# Patient Record
Sex: MALE | URBAN - NONMETROPOLITAN AREA
[De-identification: names, ages, dates, MRNs, and addresses within clinical notes are randomized per-mention and may not be internally consistent; named-entity substitution may affect disease eponyms.]

---

## 2017-01-13 ENCOUNTER — PATIENT OUTREACH (OUTPATIENT)
Dept: FAMILY MEDICINE CLINIC | Facility: CLINIC | Age: 57
End: 2017-01-13

## 2017-01-19 ENCOUNTER — PATIENT OUTREACH (OUTPATIENT)
Dept: FAMILY MEDICINE CLINIC | Facility: CLINIC | Age: 57
End: 2017-01-19

## 2017-06-12 ENCOUNTER — CHARTING TRANS (OUTPATIENT)
Dept: OTHER | Age: 57
End: 2017-06-12

## 2017-06-12 ENCOUNTER — CHARTING TRANS (OUTPATIENT)
Dept: INTERNAL MEDICINE | Age: 57
End: 2017-06-12

## 2017-06-14 ENCOUNTER — CHARTING TRANS (OUTPATIENT)
Dept: OTHER | Age: 57
End: 2017-06-14

## 2017-06-20 ENCOUNTER — CHARTING TRANS (OUTPATIENT)
Dept: OTHER | Age: 57
End: 2017-06-20

## 2017-07-07 ENCOUNTER — CHARTING TRANS (OUTPATIENT)
Dept: OTHER | Age: 57
End: 2017-07-07

## 2017-07-12 ENCOUNTER — CHARTING TRANS (OUTPATIENT)
Dept: OTHER | Age: 57
End: 2017-07-12

## 2017-07-13 ENCOUNTER — CHARTING TRANS (OUTPATIENT)
Dept: OTHER | Age: 57
End: 2017-07-13

## 2017-07-24 ENCOUNTER — CHARTING TRANS (OUTPATIENT)
Dept: OTHER | Age: 57
End: 2017-07-24

## 2017-09-14 ENCOUNTER — CHARTING TRANS (OUTPATIENT)
Dept: OTHER | Age: 57
End: 2017-09-14

## 2017-12-14 ENCOUNTER — LAB SERVICES (OUTPATIENT)
Dept: OTHER | Age: 57
End: 2017-12-14

## 2017-12-14 ENCOUNTER — CHARTING TRANS (OUTPATIENT)
Dept: INTERNAL MEDICINE | Age: 57
End: 2017-12-14

## 2017-12-14 LAB
ALBUMIN SERPL BCG-MCNC: 4.1 G/DL (ref 3.6–5.1)
ALP SERPL-CCNC: 83 U/L (ref 30–130)
ALT SERPL W/O P-5'-P-CCNC: 31 U/L (ref 10–35)
AST SERPL-CCNC: 21 U/L (ref 9–37)
BILIRUB SERPL-MCNC: 0.3 MG/DL (ref 0–1)
BUN SERPL-MCNC: 19 MG/DL (ref 6–27)
CALCIUM SERPL-MCNC: 8.9 MG/DL (ref 8.6–10.6)
CHLORIDE SERPL-SCNC: 101 MMOL/L (ref 96–107)
CREATININE, SERUM: 0.9 MG/DL (ref 0.6–1.6)
GFR SERPL CREATININE-BSD FRML MDRD: >60 ML/MIN/{1.73M2}
GFR SERPL CREATININE-BSD FRML MDRD: >60 ML/MIN/{1.73M2}
GLUCOSE SERPL-MCNC: 91 MG/DL (ref 70–200)
HCO3 SERPL-SCNC: 29 MMOL/L (ref 22–32)
POTASSIUM SERPL-SCNC: 4.3 MMOL/L (ref 3.5–5.3)
PROT SERPL-MCNC: 6.7 G/DL (ref 6.2–8.1)
SODIUM SERPL-SCNC: 137 MMOL/L (ref 136–146)
TESTOST SERPL-MCNC: 298 NG/DL (ref 200–813)
TSH SERPL-ACNC: <0.02 M[IU]/L (ref 0.3–4.82)

## 2017-12-15 ENCOUNTER — CHARTING TRANS (OUTPATIENT)
Dept: OTHER | Age: 57
End: 2017-12-15

## 2017-12-26 ENCOUNTER — CHARTING TRANS (OUTPATIENT)
Dept: OTHER | Age: 57
End: 2017-12-26

## 2018-01-02 ENCOUNTER — CHARTING TRANS (OUTPATIENT)
Dept: OTHER | Age: 58
End: 2018-01-02

## 2018-01-17 ENCOUNTER — CHARTING TRANS (OUTPATIENT)
Dept: OTHER | Age: 58
End: 2018-01-17

## 2018-01-25 ENCOUNTER — CHARTING TRANS (OUTPATIENT)
Dept: OTHER | Age: 58
End: 2018-01-25

## 2018-02-02 ENCOUNTER — CHARTING TRANS (OUTPATIENT)
Dept: OTHER | Age: 58
End: 2018-02-02

## 2018-02-05 ENCOUNTER — CHARTING TRANS (OUTPATIENT)
Dept: OTHER | Age: 58
End: 2018-02-05

## 2018-02-20 ENCOUNTER — CHARTING TRANS (OUTPATIENT)
Dept: OTHER | Age: 58
End: 2018-02-20

## 2018-02-21 ENCOUNTER — CHARTING TRANS (OUTPATIENT)
Dept: OTHER | Age: 58
End: 2018-02-21

## 2018-03-01 ENCOUNTER — LAB SERVICES (OUTPATIENT)
Dept: OTHER | Age: 58
End: 2018-03-01

## 2018-03-01 ENCOUNTER — CHARTING TRANS (OUTPATIENT)
Dept: OTHER | Age: 58
End: 2018-03-01

## 2018-03-01 LAB — TSH SERPL-ACNC: <0.02 M[IU]/L (ref 0.3–4.82)

## 2018-03-12 ENCOUNTER — CHARTING TRANS (OUTPATIENT)
Dept: OTHER | Age: 58
End: 2018-03-12

## 2018-03-29 ENCOUNTER — CHARTING TRANS (OUTPATIENT)
Dept: OTHER | Age: 58
End: 2018-03-29

## 2018-05-01 ENCOUNTER — CHARTING TRANS (OUTPATIENT)
Dept: OTHER | Age: 58
End: 2018-05-01

## 2018-05-14 ENCOUNTER — CHARTING TRANS (OUTPATIENT)
Dept: OTHER | Age: 58
End: 2018-05-14

## 2018-05-22 ENCOUNTER — CHARTING TRANS (OUTPATIENT)
Dept: OTHER | Age: 58
End: 2018-05-22

## 2018-06-11 ENCOUNTER — CHARTING TRANS (OUTPATIENT)
Dept: OTHER | Age: 58
End: 2018-06-11

## 2018-06-11 ENCOUNTER — LAB SERVICES (OUTPATIENT)
Dept: OTHER | Age: 58
End: 2018-06-11

## 2018-06-11 LAB
ALBUMIN SERPL BCG-MCNC: 4.1 G/DL (ref 3.6–5.1)
ALP SERPL-CCNC: 86 U/L (ref 45–115)
ALT SERPL W/O P-5'-P-CCNC: 36 U/L (ref 10–35)
AST SERPL-CCNC: 21 U/L (ref 9–37)
BILIRUB SERPL-MCNC: <0.2 MG/DL (ref 0–1)
BUN SERPL-MCNC: 16 MG/DL (ref 6–27)
CALCIUM SERPL-MCNC: 8.7 MG/DL (ref 8.6–10.6)
CHLORIDE SERPL-SCNC: 105 MMOL/L (ref 96–107)
CREAT SERPL-MCNC: 0.8 MG/DL (ref 0.6–1.6)
DIFFERENTIAL TYPE: ABNORMAL
GFR SERPL CREATININE-BSD FRML MDRD: >60 ML/MIN/{1.73M2}
GFR SERPL CREATININE-BSD FRML MDRD: >60 ML/MIN/{1.73M2}
GLUCOSE SERPL-MCNC: 104 MG/DL (ref 70–200)
HCO3 SERPL-SCNC: 29 MMOL/L (ref 22–32)
HEMATOCRIT: 40.7 % (ref 40–51)
HEMOGLOBIN: 14.4 G/DL (ref 13.7–17.5)
LYMPH PERCENT: 28.4 % (ref 20.5–51.1)
LYMPHOCYTE ABSOLUTE #: 1.8 10*3/UL (ref 1.2–3.4)
MEAN CORPUSCULAR HGB CONCENTRATION: 35.4 % (ref 32–36)
MEAN CORPUSCULAR HGB: 32.1 PG (ref 27–34)
MEAN CORPUSCULAR VOLUME: 90.6 FL (ref 79–95)
MEAN PLATELET VOLUME: 8.4 FL (ref 8.6–12.4)
MIXED %: 9.6 % (ref 4.3–12.9)
MIXED ABSOLUTE #: 0.6 10*3/UL (ref 0.2–0.9)
NEUTROPHIL ABSOLUTE #: 4.1 10*3/UL (ref 1.4–6.5)
NEUTROPHIL PERCENT: 62 % (ref 34–73.5)
PLATELET COUNT: 258 10*3/UL (ref 150–400)
POTASSIUM SERPL-SCNC: 4.7 MMOL/L (ref 3.5–5.3)
PROT SERPL-MCNC: 6.8 G/DL (ref 6.2–8.1)
RED BLOOD CELL COUNT: 4.49 10*6/UL (ref 3.9–5.7)
RED CELL DISTRIBUTION WIDTH: 12.9 % (ref 11.3–14.8)
SODIUM SERPL-SCNC: 139 MMOL/L (ref 136–146)
TESTOST SERPL-MCNC: 62 NG/DL (ref 200–813)
TSH SERPL DL<=0.05 MIU/L-ACNC: 0.02 M[IU]/L (ref 0.3–4.82)
WHITE BLOOD CELL COUNT: 6.5 10*3/UL (ref 4–10)

## 2018-07-02 ENCOUNTER — CHARTING TRANS (OUTPATIENT)
Dept: OTHER | Age: 58
End: 2018-07-02

## 2018-07-03 ENCOUNTER — CHARTING TRANS (OUTPATIENT)
Dept: OTHER | Age: 58
End: 2018-07-03

## 2018-07-10 ENCOUNTER — CHARTING TRANS (OUTPATIENT)
Dept: OTHER | Age: 58
End: 2018-07-10

## 2018-07-16 ENCOUNTER — CHARTING TRANS (OUTPATIENT)
Dept: OTHER | Age: 58
End: 2018-07-16

## 2018-07-17 ENCOUNTER — LAB SERVICES (OUTPATIENT)
Dept: OTHER | Age: 58
End: 2018-07-17

## 2018-07-17 LAB
ANION GAP: 9 MEQ/L (ref 8–16)
BASOPHIL AUTOMATED: 0.8 %
BASOPHILS: 0.1 (ref 0–0.2)
BLOOD UREA NITROGEN (BUN): 9 MG/DL (ref 7–25)
BUN/CREATININE RATIO: 11.7 (ref 7.4–23)
CALCIUM, SERUM: 8.1 MG/DL (ref 8.6–10.3)
CARBON DIOXIDE: 23 MMOL/L (ref 21–31)
CHLORIDE, SERUM: 95 MMOL/L (ref 98–107)
CREATININE: 0.77 MG/DL (ref 0.7–1.3)
EOSINOPHIL AUTOMATED: 1.1 %
EOSINOPHILS: 0.1 (ref 0–0.5)
EST GLOMERULAR FILTRATION RATE: > 60 1.73M SQ
GLUCOSE P FAST SERPL-MCNC: 93 MG/DL (ref 70–99)
HEMATOCRIT: 41.6 % (ref 38.6–49.2)
HEMOGLOBIN: 14.2 GM/DL (ref 13–17)
K (POTASSIUM, SERUM): 4.1 MMOL/L (ref 3.5–5.1)
LYMPHOCYTE AUTOMATED: 28.5 %
LYMPHOCYTES: 2.2 (ref 0.6–3.4)
MEAN CORPUSCULAR HGB: 31 PG (ref 26–34)
MEAN CORPUSCULAR HGB: 34.2 G/DL (ref 32.5–35.8)
MEAN CORPUSCULAR VOL: 90.6 FL (ref 80–100)
MEAN PLATELET VOLUME: 7.5 FL (ref 6.8–10.2)
MONOCYTE AUTOMATED: 8.6 %
MONOCYTES: 0.7 (ref 0.3–1)
NA (SODIUM, SERUM): 127 MMOL/L (ref 133–144)
NEUTROPHIL ABSOLUTE: 4.6 (ref 1.7–7.7)
NEUTROPHIL AUTOMATED: 61 %
PLATELET COUNT: 307 K/MM3 (ref 150–450)
RED BLOOD CELL COUNT: 4.59 M/MM3 (ref 4.34–5.6)
RED CELL DISTRIBUTIO: 12.6 % (ref 11.9–15.9)
TROPONIN I (TROP): < 0.03 NG/ML
TROPONIN I (TROP): < 0.03 NG/ML
WHITE BLOOD CELL COU: 7.6 K/MM3 (ref 4–11)

## 2018-07-18 ENCOUNTER — CHARTING TRANS (OUTPATIENT)
Dept: OTHER | Age: 58
End: 2018-07-18

## 2018-07-19 ENCOUNTER — TELEPHONE (OUTPATIENT)
Dept: FAMILY MEDICINE CLINIC | Facility: CLINIC | Age: 58
End: 2018-07-19

## 2018-07-19 NOTE — TELEPHONE ENCOUNTER
Called patients brother back instructed that we are no longer patients primary, he understood, and states he had called patients new PCP and they did not have any record of this, and instructed to call us.  Informed I cannot release information, he understo

## 2018-07-26 ENCOUNTER — CHARTING TRANS (OUTPATIENT)
Dept: OTHER | Age: 58
End: 2018-07-26

## 2018-07-27 ENCOUNTER — CHARTING TRANS (OUTPATIENT)
Dept: OTHER | Age: 58
End: 2018-07-27

## 2018-07-30 ENCOUNTER — CHARTING TRANS (OUTPATIENT)
Dept: OTHER | Age: 58
End: 2018-07-30

## 2018-08-01 ENCOUNTER — CHARTING TRANS (OUTPATIENT)
Dept: OTHER | Age: 58
End: 2018-08-01

## 2018-08-03 ENCOUNTER — CHARTING TRANS (OUTPATIENT)
Dept: OTHER | Age: 58
End: 2018-08-03

## 2018-08-15 ENCOUNTER — CHARTING TRANS (OUTPATIENT)
Dept: OTHER | Age: 58
End: 2018-08-15

## 2018-08-21 ENCOUNTER — CHARTING TRANS (OUTPATIENT)
Dept: OTHER | Age: 58
End: 2018-08-21

## 2018-08-29 ENCOUNTER — CHARTING TRANS (OUTPATIENT)
Dept: OTHER | Age: 58
End: 2018-08-29

## 2018-09-04 ENCOUNTER — CHARTING TRANS (OUTPATIENT)
Dept: OTHER | Age: 58
End: 2018-09-04

## 2018-09-10 ENCOUNTER — CHARTING TRANS (OUTPATIENT)
Dept: OTHER | Age: 58
End: 2018-09-10

## 2018-09-13 ENCOUNTER — CHARTING TRANS (OUTPATIENT)
Dept: OTHER | Age: 58
End: 2018-09-13

## 2018-09-14 ENCOUNTER — CHARTING TRANS (OUTPATIENT)
Dept: OTHER | Age: 58
End: 2018-09-14

## 2018-09-28 ENCOUNTER — CHARTING TRANS (OUTPATIENT)
Dept: OTHER | Age: 58
End: 2018-09-28

## 2018-11-01 ENCOUNTER — CHARTING TRANS (OUTPATIENT)
Dept: OTHER | Age: 58
End: 2018-11-01

## 2018-11-23 ENCOUNTER — IMAGING SERVICES (OUTPATIENT)
Dept: OTHER | Age: 58
End: 2018-11-23

## 2018-11-27 VITALS
BODY MASS INDEX: 32.73 KG/M2 | SYSTOLIC BLOOD PRESSURE: 118 MMHG | HEART RATE: 82 BPM | OXYGEN SATURATION: 98 % | HEIGHT: 69 IN | DIASTOLIC BLOOD PRESSURE: 72 MMHG | TEMPERATURE: 96.8 F | WEIGHT: 221 LBS

## 2018-11-28 VITALS
WEIGHT: 231 LBS | SYSTOLIC BLOOD PRESSURE: 122 MMHG | OXYGEN SATURATION: 98 % | BODY MASS INDEX: 34.21 KG/M2 | HEIGHT: 69 IN | HEART RATE: 77 BPM | DIASTOLIC BLOOD PRESSURE: 74 MMHG | TEMPERATURE: 97.2 F

## 2018-12-15 RX ORDER — HYDROCORTISONE 10 MG/1
TABLET ORAL
Qty: 42 TABLET | Refills: 5 | Status: SHIPPED | OUTPATIENT
Start: 2018-12-15 | End: 2019-03-04 | Stop reason: SDUPTHER

## 2018-12-15 RX ORDER — DESMOPRESSIN ACETATE 0.2 MG/1
TABLET ORAL
Qty: 84 TABLET | Refills: 5 | Status: SHIPPED | OUTPATIENT
Start: 2018-12-15 | End: 2019-03-04 | Stop reason: SDUPTHER

## 2018-12-27 RX ORDER — ESLICARBAZEPINE ACETATE 400 MG/1
TABLET ORAL
Qty: 14 TABLET | Refills: 1 | Status: SHIPPED | OUTPATIENT
Start: 2018-12-27 | End: 2019-01-23 | Stop reason: SDUPTHER

## 2019-01-01 ENCOUNTER — EXTERNAL RECORD (OUTPATIENT)
Dept: HEALTH INFORMATION MANAGEMENT | Facility: OTHER | Age: 59
End: 2019-01-01

## 2019-01-03 ENCOUNTER — APPOINTMENT (OUTPATIENT)
Dept: LAB | Age: 59
End: 2019-01-03

## 2019-01-03 ENCOUNTER — OFFICE VISIT (OUTPATIENT)
Dept: INTERNAL MEDICINE | Age: 59
End: 2019-01-03

## 2019-01-03 VITALS
BODY MASS INDEX: 35.81 KG/M2 | HEART RATE: 70 BPM | TEMPERATURE: 96.6 F | DIASTOLIC BLOOD PRESSURE: 80 MMHG | SYSTOLIC BLOOD PRESSURE: 120 MMHG | RESPIRATION RATE: 18 BRPM | WEIGHT: 236.3 LBS | HEIGHT: 68 IN

## 2019-01-03 DIAGNOSIS — E03.9 ACQUIRED HYPOTHYROIDISM: ICD-10-CM

## 2019-01-03 DIAGNOSIS — E23.2 DIABETES INSIPIDUS (CMD): ICD-10-CM

## 2019-01-03 DIAGNOSIS — E23.0 PANHYPOPITUITARISM (DIABETES INSIPIDUS/ANTERIOR PITUITARY DEFICIENCY) (CMD): Primary | ICD-10-CM

## 2019-01-03 DIAGNOSIS — S00.83XA CONTUSION OF FACE, INITIAL ENCOUNTER: ICD-10-CM

## 2019-01-03 DIAGNOSIS — E66.09 CLASS 2 OBESITY DUE TO EXCESS CALORIES WITHOUT SERIOUS COMORBIDITY WITH BODY MASS INDEX (BMI) OF 36.0 TO 36.9 IN ADULT: ICD-10-CM

## 2019-01-03 PROBLEM — R79.89 LOW TESTOSTERONE LEVEL IN MALE: Status: ACTIVE | Noted: 2017-06-12

## 2019-01-03 PROBLEM — R56.9 SEIZURE (CMD): Status: ACTIVE | Noted: 2018-10-11

## 2019-01-03 PROCEDURE — 99214 OFFICE O/P EST MOD 30 MIN: CPT | Performed by: INTERNAL MEDICINE

## 2019-01-03 RX ORDER — LAMOTRIGINE 200 MG/1
200 TABLET ORAL DAILY
COMMUNITY
Start: 2018-07-09 | End: 2023-01-01 | Stop reason: SDUPTHER

## 2019-01-03 RX ORDER — CLONAZEPAM 0.5 MG/1
0.5 TABLET ORAL DAILY
COMMUNITY
End: 2021-04-12 | Stop reason: SDUPTHER

## 2019-01-03 RX ORDER — LEVOTHYROXINE SODIUM 112 UG/1
112 TABLET ORAL DAILY
COMMUNITY
Start: 2018-10-16 | End: 2019-05-14

## 2019-01-03 SDOH — HEALTH STABILITY: MENTAL HEALTH: HOW OFTEN DO YOU HAVE A DRINK CONTAINING ALCOHOL?: NEVER

## 2019-01-03 ASSESSMENT — ENCOUNTER SYMPTOMS
RHINORRHEA: 1
FEVER: 0
WEAKNESS: 0
CONSTIPATION: 0
CHEST TIGHTNESS: 0
COUGH: 0
FACIAL SWELLING: 1
NERVOUS/ANXIOUS: 1
BLOOD IN STOOL: 0
ABDOMINAL PAIN: 0
ADENOPATHY: 0
DIARRHEA: 0
SHORTNESS OF BREATH: 0
CHILLS: 0
NAUSEA: 0
WHEEZING: 0
HEADACHES: 0
DIZZINESS: 0
SINUS PRESSURE: 1

## 2019-01-11 ENCOUNTER — TELEPHONE (OUTPATIENT)
Dept: ANTICOAGULATION | Age: 59
End: 2019-01-11

## 2019-01-15 RX ORDER — TESTOSTERONE 2 MG/D
PATCH TRANSDERMAL
Qty: 30 PATCH | Refills: 0 | Status: SHIPPED | OUTPATIENT
Start: 2019-01-15 | End: 2019-03-25 | Stop reason: SDUPTHER

## 2019-01-24 ENCOUNTER — IMAGING SERVICES (OUTPATIENT)
Dept: OTHER | Age: 59
End: 2019-01-24

## 2019-01-24 RX ORDER — ESLICARBAZEPINE ACETATE 400 MG/1
TABLET ORAL
Qty: 14 TABLET | Refills: 5 | Status: SHIPPED | OUTPATIENT
Start: 2019-01-24 | End: 2019-04-15 | Stop reason: SDUPTHER

## 2019-03-05 VITALS
DIASTOLIC BLOOD PRESSURE: 86 MMHG | HEART RATE: 69 BPM | OXYGEN SATURATION: 96 % | SYSTOLIC BLOOD PRESSURE: 129 MMHG | TEMPERATURE: 96 F | RESPIRATION RATE: 18 BRPM

## 2019-03-05 VITALS
TEMPERATURE: 96 F | HEART RATE: 70 BPM | DIASTOLIC BLOOD PRESSURE: 80 MMHG | SYSTOLIC BLOOD PRESSURE: 135 MMHG | OXYGEN SATURATION: 97 % | SYSTOLIC BLOOD PRESSURE: 119 MMHG | DIASTOLIC BLOOD PRESSURE: 72 MMHG | WEIGHT: 237 LBS | BODY MASS INDEX: 35 KG/M2 | RESPIRATION RATE: 18 BRPM | HEART RATE: 69 BPM

## 2019-03-05 VITALS
TEMPERATURE: 97.4 F | DIASTOLIC BLOOD PRESSURE: 80 MMHG | HEART RATE: 70 BPM | HEIGHT: 69 IN | SYSTOLIC BLOOD PRESSURE: 128 MMHG | BODY MASS INDEX: 34.8 KG/M2 | WEIGHT: 235 LBS | OXYGEN SATURATION: 95 %

## 2019-03-05 VITALS
BODY MASS INDEX: 35.4 KG/M2 | WEIGHT: 239 LBS | TEMPERATURE: 97.4 F | HEIGHT: 69 IN | DIASTOLIC BLOOD PRESSURE: 80 MMHG | SYSTOLIC BLOOD PRESSURE: 132 MMHG | HEART RATE: 62 BPM

## 2019-03-06 VITALS
WEIGHT: 243 LBS | DIASTOLIC BLOOD PRESSURE: 70 MMHG | HEIGHT: 69 IN | SYSTOLIC BLOOD PRESSURE: 130 MMHG | HEART RATE: 62 BPM | TEMPERATURE: 96.8 F | BODY MASS INDEX: 35.99 KG/M2 | OXYGEN SATURATION: 97 %

## 2019-03-06 VITALS
TEMPERATURE: 96.2 F | HEART RATE: 71 BPM | RESPIRATION RATE: 18 BRPM | OXYGEN SATURATION: 98 % | WEIGHT: 237 LBS | SYSTOLIC BLOOD PRESSURE: 106 MMHG | DIASTOLIC BLOOD PRESSURE: 82 MMHG | BODY MASS INDEX: 35 KG/M2

## 2019-03-07 RX ORDER — DESMOPRESSIN ACETATE 0.2 MG/1
TABLET ORAL
Qty: 180 TABLET | Refills: 4 | Status: SHIPPED | OUTPATIENT
Start: 2019-03-07 | End: 2019-07-22 | Stop reason: SDUPTHER

## 2019-03-07 RX ORDER — LEVOTHYROXINE SODIUM 112 UG/1
TABLET ORAL
Qty: 14 TABLET | Refills: 5 | OUTPATIENT
Start: 2019-03-07

## 2019-03-07 RX ORDER — HYDROCORTISONE 10 MG/1
TABLET ORAL
Qty: 60 TABLET | Refills: 4 | Status: SHIPPED | OUTPATIENT
Start: 2019-03-07 | End: 2019-06-10 | Stop reason: SDUPTHER

## 2019-03-25 RX ORDER — TESTOSTERONE 2 MG/D
PATCH TRANSDERMAL
Qty: 30 PATCH | Refills: 4 | Status: SHIPPED | OUTPATIENT
Start: 2019-03-25 | End: 2019-08-02 | Stop reason: SDUPTHER

## 2019-04-04 LAB
*MEAN CORPUSCULAR HGB CONC: 36 G/DL (ref 32.5–35.8)
A/G RATIO: 1.35 (ref 1.1–2.4)
ALANINE AMINOTRANSFE: 18 U/L (ref 7–52)
ALBUMIN, SERUM (ALB): 4.2 G/DL (ref 3.5–5.7)
ALKALINE PHOSPHATASE (ALK): 73 U/L (ref 34–104)
ANION GAP: 8 MEQ/L (ref 8–16)
ASPARTATE AMINOTRANS: 16 U/L (ref 13–39)
BASOPHIL AUTOMATED: 1.2 %
BASOPHILS: 0.1 (ref 0–0.2)
BILIRUBIN, TOTAL: 0.4 MG/DL (ref 0.2–0.8)
BLOOD UREA NITROGEN (BUN): 13 MG/DL (ref 7–25)
BUN/CREATININE RATIO: 14.8 (ref 7.4–23)
CALCIUM, SERUM: 9 MG/DL (ref 8.6–10.3)
CARBON DIOXIDE: 24 MMOL/L (ref 21–31)
CHLORIDE, SERUM: 101 MMOL/L (ref 98–107)
CREATININE: 0.88 MG/DL (ref 0.7–1.3)
EOSINOPHIL AUTOMATED: 1.3 %
EOSINOPHILS: 0.1 (ref 0–0.5)
EST GLOMERULAR FILTRATION RATE: > 60 1.73M SQ
GLUCOSE: 103 MG/DL (ref 70–99)
HEMATOCRIT: 39.2 % (ref 38.6–49.2)
HEMOGLOBIN: 14.1 GM/DL (ref 13–17)
INFLUENZA ANTIGEN: NORMAL
K (POTASSIUM, SERUM): 3.9 MMOL/L (ref 3.5–5.1)
LIPASE: 7 U/L (ref 11–82)
LYMPHOCYTE AUTOMATED: 20.9 %
LYMPHOCYTES: 1.7 (ref 0.6–3.4)
MEAN CORPUSCULAR HGB: 31.5 PG (ref 26–34)
MEAN CORPUSCULAR VOL: 87.5 FL (ref 80–100)
MEAN PLATELET VOLUME: 7.6 FL (ref 6.8–10.2)
MONOCYTE AUTOMATED: 9.1 %
MONOCYTES: 0.7 (ref 0.3–1)
NA (SODIUM, SERUM): 133 MMOL/L (ref 133–144)
NEUTROPHIL ABSOLUTE: 5.4 (ref 1.7–7.7)
NEUTROPHIL AUTOMATED: 67.5 %
PLATELET COUNT: 378 K/MM3 (ref 150–450)
PROTEIN TOTAL: 7.3 G/DL (ref 6.4–8.9)
RED BLOOD CELL COUNT: 4.48 M/MM3 (ref 4.34–5.6)
RED CELL DISTRIBUTIO: 13.2 % (ref 11.9–15.9)
WHITE BLOOD CELL COU: 8 K/MM3 (ref 4–11)

## 2019-04-11 ENCOUNTER — TELEPHONE (OUTPATIENT)
Dept: ANTICOAGULATION | Age: 59
End: 2019-04-11

## 2019-04-16 RX ORDER — ESLICARBAZEPINE ACETATE 400 MG/1
TABLET ORAL
Qty: 14 TABLET | Refills: 5 | Status: SHIPPED | OUTPATIENT
Start: 2019-04-16 | End: 2019-07-08 | Stop reason: SDUPTHER

## 2019-05-28 DIAGNOSIS — E03.9 ACQUIRED HYPOTHYROIDISM: Primary | ICD-10-CM

## 2019-06-04 ENCOUNTER — TELEPHONE (OUTPATIENT)
Dept: INTERNAL MEDICINE | Age: 59
End: 2019-06-04

## 2019-06-04 RX ORDER — LEVOTHYROXINE SODIUM 112 UG/1
TABLET ORAL
Qty: 30 TABLET | Refills: 1 | Status: SHIPPED | OUTPATIENT
Start: 2019-06-04 | End: 2019-07-22 | Stop reason: SDUPTHER

## 2019-06-10 RX ORDER — HYDROCORTISONE 10 MG/1
TABLET ORAL
Qty: 90 TABLET | Refills: 2 | Status: SHIPPED | OUTPATIENT
Start: 2019-06-10 | End: 2019-09-05 | Stop reason: SDUPTHER

## 2019-07-08 RX ORDER — ESLICARBAZEPINE ACETATE 400 MG/1
TABLET ORAL
Qty: 30 TABLET | Refills: 1 | Status: SHIPPED | OUTPATIENT
Start: 2019-07-08 | End: 2019-09-05 | Stop reason: SDUPTHER

## 2019-07-11 ENCOUNTER — OFFICE VISIT (OUTPATIENT)
Dept: INTERNAL MEDICINE | Age: 59
End: 2019-07-11

## 2019-07-11 VITALS
BODY MASS INDEX: 34.71 KG/M2 | HEART RATE: 72 BPM | HEIGHT: 68 IN | SYSTOLIC BLOOD PRESSURE: 120 MMHG | DIASTOLIC BLOOD PRESSURE: 72 MMHG | TEMPERATURE: 96.6 F | WEIGHT: 229 LBS

## 2019-07-11 DIAGNOSIS — E03.9 ACQUIRED HYPOTHYROIDISM: Primary | ICD-10-CM

## 2019-07-11 DIAGNOSIS — Z13.220 LIPID SCREENING: ICD-10-CM

## 2019-07-11 DIAGNOSIS — E23.0 PANHYPOPITUITARISM (DIABETES INSIPIDUS/ANTERIOR PITUITARY DEFICIENCY) (CMD): ICD-10-CM

## 2019-07-11 DIAGNOSIS — R79.89 LOW TESTOSTERONE LEVEL IN MALE: ICD-10-CM

## 2019-07-11 DIAGNOSIS — E23.2 DIABETES INSIPIDUS (CMD): ICD-10-CM

## 2019-07-11 PROCEDURE — 99214 OFFICE O/P EST MOD 30 MIN: CPT | Performed by: INTERNAL MEDICINE

## 2019-07-11 SDOH — HEALTH STABILITY: MENTAL HEALTH: HOW OFTEN DO YOU HAVE A DRINK CONTAINING ALCOHOL?: NEVER

## 2019-07-11 ASSESSMENT — ENCOUNTER SYMPTOMS
SEIZURES: 0
SHORTNESS OF BREATH: 0
ABDOMINAL PAIN: 0
FEVER: 0
NEUROLOGICAL NEGATIVE: 1
BLOOD IN STOOL: 0
NUMBNESS: 0
CONSTIPATION: 0
HEADACHES: 0
DIZZINESS: 0
DIARRHEA: 0
ACTIVITY CHANGE: 0
ADENOPATHY: 0
COUGH: 0
CHILLS: 0
WHEEZING: 0
NAUSEA: 0
CHEST TIGHTNESS: 0
BACK PAIN: 0

## 2019-07-12 ASSESSMENT — ENCOUNTER SYMPTOMS
WEAKNESS: 0
SORE THROAT: 0
SINUS PRESSURE: 0
NERVOUS/ANXIOUS: 1
HALLUCINATIONS: 0
UNEXPECTED WEIGHT CHANGE: 1
TROUBLE SWALLOWING: 0
SLEEP DISTURBANCE: 0

## 2019-07-26 RX ORDER — DESMOPRESSIN ACETATE 0.2 MG/1
TABLET ORAL
Qty: 84 TABLET | Refills: 5 | Status: SHIPPED | OUTPATIENT
Start: 2019-07-26 | End: 2020-06-04 | Stop reason: SDUPTHER

## 2019-07-26 RX ORDER — LEVOTHYROXINE SODIUM 112 UG/1
TABLET ORAL
Qty: 14 TABLET | Refills: 5 | Status: SHIPPED | OUTPATIENT
Start: 2019-07-26 | End: 2019-11-21 | Stop reason: DRUGHIGH

## 2019-08-02 RX ORDER — TESTOSTERONE 2 MG/D
PATCH TRANSDERMAL
Qty: 30 PATCH | Refills: 4 | Status: SHIPPED | OUTPATIENT
Start: 2019-08-02 | End: 2020-01-08 | Stop reason: SDUPTHER

## 2019-08-10 ENCOUNTER — EXTERNAL RECORD (OUTPATIENT)
Dept: HEALTH INFORMATION MANAGEMENT | Facility: OTHER | Age: 59
End: 2019-08-10

## 2019-08-12 ENCOUNTER — TELEPHONE (OUTPATIENT)
Dept: INTERNAL MEDICINE | Age: 59
End: 2019-08-12

## 2019-08-21 RX ORDER — LEVOTHYROXINE SODIUM 0.1 MG/1
100 TABLET ORAL DAILY
Qty: 30 TABLET | Refills: 0 | Status: SHIPPED | OUTPATIENT
Start: 2019-08-21 | End: 2019-09-04 | Stop reason: SDUPTHER

## 2019-08-21 RX ORDER — PRAVASTATIN SODIUM 10 MG
10 TABLET ORAL DAILY
Qty: 30 TABLET | Refills: 4 | Status: SHIPPED | OUTPATIENT
Start: 2019-08-21 | End: 2020-06-04 | Stop reason: SDUPTHER

## 2019-09-04 RX ORDER — LEVOTHYROXINE SODIUM 0.1 MG/1
TABLET ORAL
Qty: 30 TABLET | Refills: 2 | Status: SHIPPED | OUTPATIENT
Start: 2019-09-04 | End: 2019-11-21 | Stop reason: DRUGHIGH

## 2019-09-06 RX ORDER — HYDROCORTISONE 10 MG/1
TABLET ORAL
Qty: 90 TABLET | Refills: 2 | Status: SHIPPED | OUTPATIENT
Start: 2019-09-06 | End: 2019-12-11 | Stop reason: SDUPTHER

## 2019-09-06 RX ORDER — ESLICARBAZEPINE ACETATE 400 MG/1
TABLET ORAL
Qty: 30 TABLET | Refills: 2 | Status: SHIPPED | OUTPATIENT
Start: 2019-09-06 | End: 2019-12-11 | Stop reason: SDUPTHER

## 2019-09-12 ENCOUNTER — TELEPHONE (OUTPATIENT)
Dept: INTERNAL MEDICINE | Age: 59
End: 2019-09-12

## 2019-09-12 DIAGNOSIS — E23.0 PANHYPOPITUITARISM (DIABETES INSIPIDUS/ANTERIOR PITUITARY DEFICIENCY) (CMD): ICD-10-CM

## 2019-09-12 DIAGNOSIS — R79.89 LOW TESTOSTERONE LEVEL IN MALE: Primary | ICD-10-CM

## 2019-09-12 DIAGNOSIS — E03.9 ACQUIRED HYPOTHYROIDISM: ICD-10-CM

## 2019-10-25 ENCOUNTER — TELEPHONE (OUTPATIENT)
Dept: INTERNAL MEDICINE | Age: 59
End: 2019-10-25

## 2019-10-29 RX ORDER — DESMOPRESSIN ACETATE 0.2 MG/1
TABLET ORAL
Qty: 84 TABLET | Refills: 5 | Status: SHIPPED | OUTPATIENT
Start: 2019-10-29 | End: 2020-06-04 | Stop reason: SDUPTHER

## 2019-11-01 ENCOUNTER — EXTERNAL RECORD (OUTPATIENT)
Dept: HEALTH INFORMATION MANAGEMENT | Facility: OTHER | Age: 59
End: 2019-11-01

## 2019-11-08 ENCOUNTER — OFFICE VISIT (OUTPATIENT)
Dept: INTERNAL MEDICINE | Age: 59
End: 2019-11-08

## 2019-11-08 VITALS
HEIGHT: 68 IN | SYSTOLIC BLOOD PRESSURE: 120 MMHG | BODY MASS INDEX: 33.95 KG/M2 | DIASTOLIC BLOOD PRESSURE: 84 MMHG | HEART RATE: 68 BPM | WEIGHT: 224 LBS | TEMPERATURE: 96.5 F

## 2019-11-08 DIAGNOSIS — E03.9 ACQUIRED HYPOTHYROIDISM: ICD-10-CM

## 2019-11-08 DIAGNOSIS — Z00.00 ROUTINE GENERAL MEDICAL EXAMINATION AT A HEALTH CARE FACILITY: Primary | ICD-10-CM

## 2019-11-08 DIAGNOSIS — Z23 NEED FOR INFLUENZA VACCINATION: ICD-10-CM

## 2019-11-08 DIAGNOSIS — R79.89 LOW TESTOSTERONE LEVEL IN MALE: ICD-10-CM

## 2019-11-08 DIAGNOSIS — E23.2 DIABETES INSIPIDUS (CMD): ICD-10-CM

## 2019-11-08 DIAGNOSIS — Z12.11 ENCOUNTER FOR SCREENING COLONOSCOPY: ICD-10-CM

## 2019-11-08 DIAGNOSIS — R56.9 SEIZURE (CMD): ICD-10-CM

## 2019-11-08 DIAGNOSIS — Z12.11 SCREEN FOR COLON CANCER: ICD-10-CM

## 2019-11-08 DIAGNOSIS — Z12.5 SCREENING FOR PROSTATE CANCER: ICD-10-CM

## 2019-11-08 PROCEDURE — G0439 PPPS, SUBSEQ VISIT: HCPCS | Performed by: INTERNAL MEDICINE

## 2019-11-08 PROCEDURE — 99214 OFFICE O/P EST MOD 30 MIN: CPT | Performed by: INTERNAL MEDICINE

## 2019-11-08 PROCEDURE — G0008 ADMIN INFLUENZA VIRUS VAC: HCPCS

## 2019-11-08 PROCEDURE — 90686 IIV4 VACC NO PRSV 0.5 ML IM: CPT

## 2019-11-08 SDOH — HEALTH STABILITY: MENTAL HEALTH: HOW OFTEN DO YOU HAVE A DRINK CONTAINING ALCOHOL?: NEVER

## 2019-11-08 ASSESSMENT — ENCOUNTER SYMPTOMS
WHEEZING: 0
BACK PAIN: 0
BLOOD IN STOOL: 0
SLEEP DISTURBANCE: 0
NUMBNESS: 0
SINUS PRESSURE: 0
ABDOMINAL PAIN: 0
NAUSEA: 0
TROUBLE SWALLOWING: 0
DIARRHEA: 0
CHEST TIGHTNESS: 0
ACTIVITY CHANGE: 0
SEIZURES: 0
NEUROLOGICAL NEGATIVE: 1
COUGH: 0
NERVOUS/ANXIOUS: 1
WEAKNESS: 0
ADENOPATHY: 0
CHILLS: 0
SORE THROAT: 0
FEVER: 0
DIZZINESS: 0
SHORTNESS OF BREATH: 0
CONSTIPATION: 0
HEADACHES: 0
HALLUCINATIONS: 0
UNEXPECTED WEIGHT CHANGE: 0

## 2019-11-11 ENCOUNTER — TELEPHONE (OUTPATIENT)
Dept: GASTROENTEROLOGY | Age: 59
End: 2019-11-11

## 2019-11-11 DIAGNOSIS — Z12.11 SCREENING FOR COLON CANCER: Primary | ICD-10-CM

## 2019-11-11 ASSESSMENT — ENCOUNTER SYMPTOMS: CONFUSION: 0

## 2019-11-13 ENCOUNTER — TELEPHONE (OUTPATIENT)
Dept: GASTROENTEROLOGY | Age: 59
End: 2019-11-13

## 2019-11-13 DIAGNOSIS — Z12.11 SCREENING FOR COLON CANCER: Primary | ICD-10-CM

## 2019-11-13 RX ORDER — SIMETHICONE 125 MG
TABLET,CHEWABLE ORAL
Qty: 2 TABLET | Refills: 0 | Status: SHIPPED | OUTPATIENT
Start: 2019-11-13 | End: 2019-12-28

## 2019-11-13 RX ORDER — BISACODYL 5 MG/1
TABLET, DELAYED RELEASE ORAL
Qty: 2 TABLET | Refills: 0 | Status: SHIPPED | OUTPATIENT
Start: 2019-11-13 | End: 2019-12-28

## 2019-11-14 ENCOUNTER — TELEPHONE (OUTPATIENT)
Dept: GASTROENTEROLOGY | Age: 59
End: 2019-11-14

## 2019-11-21 ENCOUNTER — TELEPHONE (OUTPATIENT)
Dept: INTERNAL MEDICINE | Age: 59
End: 2019-11-21

## 2019-11-21 DIAGNOSIS — E03.9 ACQUIRED HYPOTHYROIDISM: Primary | ICD-10-CM

## 2019-11-21 RX ORDER — LEVOTHYROXINE SODIUM 88 UG/1
88 TABLET ORAL DAILY
Qty: 30 TABLET | Refills: 0 | Status: SHIPPED | OUTPATIENT
Start: 2019-11-21 | End: 2019-12-11 | Stop reason: SDUPTHER

## 2019-12-03 ENCOUNTER — APPOINTMENT (OUTPATIENT)
Dept: GASTROENTEROLOGY | Age: 59
End: 2019-12-03
Attending: INTERNAL MEDICINE

## 2019-12-09 ENCOUNTER — APPOINTMENT (OUTPATIENT)
Dept: OTHER | Facility: PHYSICIAN GROUP | Age: 59
End: 2019-12-09
Attending: INTERNAL MEDICINE

## 2019-12-09 ENCOUNTER — EXTERNAL RECORD (OUTPATIENT)
Dept: GASTROENTEROLOGY | Age: 59
End: 2019-12-09

## 2019-12-09 PROCEDURE — 45380 COLONOSCOPY AND BIOPSY: CPT | Performed by: INTERNAL MEDICINE

## 2019-12-09 PROCEDURE — 45385 COLONOSCOPY W/LESION REMOVAL: CPT | Performed by: INTERNAL MEDICINE

## 2019-12-13 RX ORDER — HYDROCORTISONE 10 MG/1
TABLET ORAL
Qty: 90 TABLET | Refills: 5 | Status: SHIPPED | OUTPATIENT
Start: 2019-12-13 | End: 2020-05-29

## 2019-12-13 RX ORDER — LEVOTHYROXINE SODIUM 88 UG/1
TABLET ORAL
Qty: 14 TABLET | Refills: 0 | Status: SHIPPED | OUTPATIENT
Start: 2019-12-13 | End: 2019-12-23 | Stop reason: SDUPTHER

## 2019-12-13 RX ORDER — ESLICARBAZEPINE ACETATE 400 MG/1
TABLET ORAL
Qty: 30 TABLET | Refills: 5 | Status: SHIPPED | OUTPATIENT
Start: 2019-12-13 | End: 2020-05-29

## 2019-12-17 RX ORDER — PRAVASTATIN SODIUM 10 MG
TABLET ORAL
Qty: 30 TABLET | Refills: 4 | Status: SHIPPED | OUTPATIENT
Start: 2019-12-17 | End: 2020-04-29

## 2019-12-20 ENCOUNTER — EXTERNAL RECORD (OUTPATIENT)
Dept: HEALTH INFORMATION MANAGEMENT | Facility: OTHER | Age: 59
End: 2019-12-20

## 2019-12-24 RX ORDER — LEVOTHYROXINE SODIUM 88 UG/1
TABLET ORAL
Qty: 14 TABLET | Refills: 5 | Status: SHIPPED | OUTPATIENT
Start: 2019-12-24 | End: 2020-03-30

## 2020-01-03 ENCOUNTER — TELEPHONE (OUTPATIENT)
Dept: INTERNAL MEDICINE | Age: 60
End: 2020-01-03

## 2020-01-10 RX ORDER — TESTOSTERONE 2 MG/D
PATCH TRANSDERMAL
Qty: 30 PATCH | Refills: 4 | Status: SHIPPED | OUTPATIENT
Start: 2020-01-10 | End: 2020-05-22

## 2020-01-24 RX ORDER — DESMOPRESSIN ACETATE 0.2 MG/1
TABLET ORAL
Qty: 84 TABLET | Refills: 5 | Status: SHIPPED | OUTPATIENT
Start: 2020-01-24 | End: 2020-04-14

## 2020-03-25 LAB
*MEAN CORPUSCULAR HGB CONC: 33.8 G/DL (ref 32.5–35.8)
A/G RATIO: 1.43 (ref 1.1–2.4)
ALANINE AMINOTRANSFE: 23 U/L (ref 7–52)
ALBUMIN, SERUM (ALB): 4.3 G/DL (ref 3.5–5.7)
ALKALINE PHOSPHATASE (ALK): 58 U/L (ref 34–104)
ANION GAP: 6 MEQ/L (ref 6.2–14.7)
ASPARTATE AMINOTRANS: 21 U/L (ref 13–39)
BASOPHIL AUTOMATED: 0.9 %
BASOPHILS: 0.1 (ref 0–0.2)
BILIRUBIN, TOTAL: 0.5 MG/DL (ref 0.2–0.8)
BLOOD UREA NITROGEN (BUN): 20 MG/DL (ref 7–25)
BUN/CREATININE RATIO: 16.1 (ref 7.4–23)
CALCIUM, SERUM: 8.7 MG/DL (ref 8.6–10.3)
CARBON DIOXIDE: 28 MMOL/L (ref 21–31)
CHLORIDE, SERUM: 100 MMOL/L (ref 98–107)
CREATININE: 1.24 MG/DL (ref 0.7–1.3)
EOSINOPHIL AUTOMATED: 2.5 %
EOSINOPHILS: 0.2 (ref 0–0.5)
EST GLOMERULAR FILTRATION RATE: 60 1.73M SQ
GLUCOSE: 97 MG/DL (ref 70–99)
HEMATOCRIT: 40.6 % (ref 38.6–49.2)
HEMOGLOBIN: 13.7 GM/DL (ref 13–17)
INFLUENZA A BY PCR: POSITIVE
INFLUENZA B BY PCR: NORMAL
K (POTASSIUM, SERUM): 3.6 MMOL/L (ref 3.5–5.1)
LACTIC ACID: 0.9 MMOL/L (ref 0.5–2)
LYMPHOCYTE AUTOMATED: 19.8 %
LYMPHOCYTES: 1.4 (ref 0.6–3.4)
MEAN CORPUSCULAR HGB: 31.1 PG (ref 26–34)
MEAN CORPUSCULAR VOL: 92.1 FL (ref 80–100)
MEAN PLATELET VOLUME: 7.1 FL (ref 6.8–10.2)
MONOCYTE AUTOMATED: 16.5 %
MONOCYTES: 1.1 (ref 0.3–1)
NA (SODIUM, SERUM): 134 MMOL/L (ref 133–144)
NEUTROPHIL ABSOLUTE: 4.2 (ref 1.7–7.7)
NEUTROPHIL AUTOMATED: 60.3 %
PLATELET COUNT: 238 K/MM3 (ref 150–450)
PROCALCITONIN (NUMERIC): 0.31 NG/ML (ref 0.2–0.5)
PROTEIN TOTAL: 7.3 G/DL (ref 6.4–8.9)
RED BLOOD CELL COUNT: 4.4 M/MM3 (ref 4.34–5.6)
RED CELL DISTRIBUTIO: 13.3 % (ref 11.9–15.9)
WHITE BLOOD CELL COU: 7 K/MM3 (ref 4–11)

## 2020-03-25 PROCEDURE — 93010 ELECTROCARDIOGRAM REPORT: CPT | Performed by: INTERNAL MEDICINE

## 2020-03-27 ENCOUNTER — TELEPHONE (OUTPATIENT)
Dept: INTERNAL MEDICINE | Age: 60
End: 2020-03-27

## 2020-03-27 LAB
COVID-19 RESULT: NOT DETECTED
COVID-19 SOURCE: NORMAL

## 2020-03-30 LAB
BLOOD CULTURE: NORMAL
BLOOD CULTURE: NORMAL

## 2020-03-30 RX ORDER — LEVOTHYROXINE SODIUM 88 UG/1
TABLET ORAL
Qty: 30 TABLET | Refills: 1 | Status: SHIPPED | OUTPATIENT
Start: 2020-03-30 | End: 2020-06-12

## 2020-03-31 RX ORDER — LEVOTHYROXINE SODIUM 88 UG/1
TABLET ORAL
Qty: 14 TABLET | Refills: 5 | Status: CANCELLED | OUTPATIENT
Start: 2020-03-31

## 2020-04-03 ENCOUNTER — TELEPHONE (OUTPATIENT)
Dept: INTERNAL MEDICINE | Age: 60
End: 2020-04-03

## 2020-04-14 RX ORDER — DESMOPRESSIN ACETATE 0.2 MG/1
TABLET ORAL
Qty: 84 TABLET | Refills: 2 | Status: SHIPPED | OUTPATIENT
Start: 2020-04-14 | End: 2020-05-29

## 2020-04-29 RX ORDER — PRAVASTATIN SODIUM 10 MG
TABLET ORAL
Qty: 14 TABLET | Refills: 0 | Status: SHIPPED | OUTPATIENT
Start: 2020-04-29 | End: 2020-05-14

## 2020-04-30 ENCOUNTER — TELEPHONE (OUTPATIENT)
Dept: INTERNAL MEDICINE | Age: 60
End: 2020-04-30

## 2020-05-01 ENCOUNTER — TELEPHONE (OUTPATIENT)
Dept: INTERNAL MEDICINE | Age: 60
End: 2020-05-01

## 2020-05-01 ENCOUNTER — OFFICE VISIT (OUTPATIENT)
Dept: INTERNAL MEDICINE | Age: 60
End: 2020-05-01

## 2020-05-01 DIAGNOSIS — E23.0 PANHYPOPITUITARISM (DIABETES INSIPIDUS/ANTERIOR PITUITARY DEFICIENCY) (CMD): ICD-10-CM

## 2020-05-01 DIAGNOSIS — H81.10 BENIGN PAROXYSMAL VERTIGO, UNSPECIFIED LATERALITY: Primary | ICD-10-CM

## 2020-05-01 DIAGNOSIS — D33.2 BENIGN NEOPLASM OF BRAIN, UNSPECIFIED BRAIN REGION (CMD): ICD-10-CM

## 2020-05-01 PROBLEM — R41.89 IMPAIRED COGNITION: Status: ACTIVE | Noted: 2020-05-01

## 2020-05-01 PROCEDURE — 99442 TELEPHONE E&M BY PHYSICIAN EST PT NOT ORIG PREV 7 DAYS 11-20 MIN: CPT | Performed by: INTERNAL MEDICINE

## 2020-05-01 RX ORDER — MECLIZINE HYDROCHLORIDE 25 MG/1
25 TABLET ORAL 3 TIMES DAILY PRN
Qty: 20 TABLET | Refills: 0 | Status: SHIPPED | OUTPATIENT
Start: 2020-05-01 | End: 2023-05-10

## 2020-05-01 RX ORDER — ONDANSETRON 4 MG/1
4 TABLET, ORALLY DISINTEGRATING ORAL EVERY 8 HOURS PRN
Qty: 20 TABLET | Refills: 0 | Status: SHIPPED | OUTPATIENT
Start: 2020-05-01 | End: 2020-05-04 | Stop reason: ALTCHOICE

## 2020-05-01 SDOH — HEALTH STABILITY: MENTAL HEALTH: HOW OFTEN DO YOU HAVE A DRINK CONTAINING ALCOHOL?: NEVER

## 2020-05-01 ASSESSMENT — ENCOUNTER SYMPTOMS
WEAKNESS: 0
NAUSEA: 1
NUMBNESS: 0
DIAPHORESIS: 0
RESPIRATORY NEGATIVE: 1
CONSTIPATION: 0
BLOOD IN STOOL: 0
SEIZURES: 1
RHINORRHEA: 0
APPETITE CHANGE: 0
FEVER: 0
CHILLS: 0
HEADACHES: 0
DIZZINESS: 1
SORE THROAT: 0
DIARRHEA: 0
ABDOMINAL PAIN: 0
VOMITING: 1
SPEECH DIFFICULTY: 0
SINUS PAIN: 0
LIGHT-HEADEDNESS: 1

## 2020-05-03 ENCOUNTER — EXTERNAL RECORD (OUTPATIENT)
Dept: HEALTH INFORMATION MANAGEMENT | Facility: OTHER | Age: 60
End: 2020-05-03

## 2020-05-03 LAB
*MEAN CORPUSCULAR HGB CONC: 35.7 G/DL (ref 32.5–35.8)
A/G RATIO: 1.26 (ref 1.1–2.4)
ALANINE AMINOTRANSFE: 23 U/L (ref 7–52)
ALBUMIN, SERUM (ALB): 4.4 G/DL (ref 3.5–5.7)
ALKALINE PHOSPHATASE (ALK): 75 U/L (ref 34–104)
ANION GAP: 8 MEQ/L (ref 6.2–14.7)
ASPARTATE AMINOTRANS: 14 U/L (ref 13–39)
BASOPHIL AUTOMATED: 0.9 %
BASOPHILS: 0.1 (ref 0–0.2)
BILIRUBIN, TOTAL: 0.6 MG/DL (ref 0.2–0.8)
BLOOD UREA NITROGEN (BUN): 12 MG/DL (ref 7–25)
BUN/CREATININE RATIO: 12.4 (ref 7.4–23)
CALCIUM, SERUM: 9.4 MG/DL (ref 8.6–10.3)
CARBON DIOXIDE: 25 MMOL/L (ref 21–31)
CHLORIDE, SERUM: 90 MMOL/L (ref 98–107)
CREATININE: 0.97 MG/DL (ref 0.7–1.3)
EOSINOPHIL AUTOMATED: 1.3 %
EOSINOPHILS: 0.2 (ref 0–0.5)
EST GLOMERULAR FILTRATION RATE: > 60 1.73M SQ
GLUCOSE: 113 MG/DL (ref 70–99)
HEMATOCRIT: 42.7 % (ref 38.6–49.2)
HEMOGLOBIN: 15.2 GM/DL (ref 13–17)
K (POTASSIUM, SERUM): 4.1 MMOL/L (ref 3.5–5.1)
LYMPHOCYTE AUTOMATED: 16.3 %
LYMPHOCYTES: 2.3 (ref 0.6–3.4)
MEAN CORPUSCULAR HGB: 31.9 PG (ref 26–34)
MEAN CORPUSCULAR VOL: 89.6 FL (ref 80–100)
MEAN PLATELET VOLUME: 6.8 FL (ref 6.8–10.2)
MONOCYTE AUTOMATED: 13.4 %
MONOCYTES: 1.9 (ref 0.3–1)
NA (SODIUM, SERUM): 123 MMOL/L (ref 133–144)
NEUTROPHIL ABSOLUTE: 9.5 (ref 1.7–7.7)
NEUTROPHIL AUTOMATED: 68.1 %
PLATELET COUNT: 361 K/MM3 (ref 150–450)
PROTEIN TOTAL: 7.9 G/DL (ref 6.4–8.9)
RED BLOOD CELL COUNT: 4.77 M/MM3 (ref 4.34–5.6)
RED CELL DISTRIBUTIO: 13 % (ref 11.9–15.9)
TROPONIN I HIGH SENSITIVITY: 3 PG/ML (ref 0–20)
TROPONIN I HIGH SENSITIVITY: < 2 PG/ML (ref 0–20)
WHITE BLOOD CELL COU: 13.9 K/MM3 (ref 4–11)

## 2020-05-03 PROCEDURE — 93010 ELECTROCARDIOGRAM REPORT: CPT | Performed by: INTERNAL MEDICINE

## 2020-05-04 ENCOUNTER — TELEPHONE (OUTPATIENT)
Dept: INTERNAL MEDICINE | Age: 60
End: 2020-05-04

## 2020-05-04 ENCOUNTER — EXTERNAL RECORD (OUTPATIENT)
Dept: OTHER | Age: 60
End: 2020-05-04

## 2020-05-04 ENCOUNTER — EXTERNAL RECORD (OUTPATIENT)
Dept: NEPHROLOGY | Age: 60
End: 2020-05-04

## 2020-05-04 LAB
*MEAN CORPUSCULAR HGB CONC: 35.3 G/DL (ref 32.5–35.8)
A/G RATIO: 1.34 (ref 1.1–2.4)
ALANINE AMINOTRANSFE: 22 U/L (ref 7–52)
ALBUMIN, SERUM (ALB): 4.3 G/DL (ref 3.5–5.7)
ALKALINE PHOSPHATASE (ALK): 73 U/L (ref 34–104)
ANION GAP: 10 MEQ/L (ref 6.2–14.7)
ANION GAP: 9 MEQ/L (ref 6.2–14.7)
ASPARTATE AMINOTRANS: 15 U/L (ref 13–39)
BASOPHIL AUTOMATED: 0.7 %
BASOPHILS: 0.1 (ref 0–0.2)
BILIRUBIN, TOTAL: 0.5 MG/DL (ref 0.2–0.8)
BLOOD UREA NITROGEN (BUN): 10 MG/DL (ref 7–25)
BLOOD UREA NITROGEN (BUN): 7 MG/DL (ref 7–25)
BUN/CREATININE RATIO: 7.3 (ref 7.4–23)
BUN/CREATININE RATIO: 8 (ref 7.4–23)
CALCIUM, SERUM: 9.3 MG/DL (ref 8.6–10.3)
CALCIUM, SERUM: 9.9 MG/DL (ref 8.6–10.3)
CARBON DIOXIDE: 21 MMOL/L (ref 21–31)
CARBON DIOXIDE: 22 MMOL/L (ref 21–31)
CHLORIDE, SERUM: 109 MMOL/L (ref 98–107)
CHLORIDE, SERUM: 110 MMOL/L (ref 98–107)
CORTISOL AM: 8.1 UG/DL (ref 6.7–22.6)
CREATININE: 0.96 MG/DL (ref 0.7–1.3)
CREATININE: 1.25 MG/DL (ref 0.7–1.3)
EOSINOPHIL AUTOMATED: 1.2 %
EOSINOPHILS: 0.2 (ref 0–0.5)
EST GLOMERULAR FILTRATION RATE: 59 1.73M SQ
EST GLOMERULAR FILTRATION RATE: > 60 1.73M SQ
GLUCOSE: 149 MG/DL (ref 70–99)
GLUCOSE: 86 MG/DL (ref 70–99)
HEMATOCRIT: 44.4 % (ref 38.6–49.2)
HEMOGLOBIN: 15.7 GM/DL (ref 13–17)
K (POTASSIUM, SERUM): 4.9 MMOL/L (ref 3.5–5.1)
K (POTASSIUM, SERUM): 4.9 MMOL/L (ref 3.5–5.1)
LYMPHOCYTE AUTOMATED: 17.2 %
LYMPHOCYTES: 2.3 (ref 0.6–3.4)
MEAN CORPUSCULAR HGB: 32 PG (ref 26–34)
MEAN CORPUSCULAR VOL: 90.5 FL (ref 80–100)
MEAN PLATELET VOLUME: 6.8 FL (ref 6.8–10.2)
MG (MAGNESIUM, SERUM: 2.3 MG/DL (ref 1.6–2.6)
MONOCYTE AUTOMATED: 10.8 %
MONOCYTES: 1.4 (ref 0.3–1)
NA (SODIUM, SERUM): 140 MMOL/L (ref 133–144)
NA (SODIUM, SERUM): 141 MMOL/L (ref 133–144)
NEUTROPHIL ABSOLUTE: 9.1 (ref 1.7–7.7)
NEUTROPHIL AUTOMATED: 70.1 %
OSMOLALITY, RANDOM URINE: 67 MOSM/KG (ref 50–1200)
OSMOLALITY, SERUM: 293 UOSM/KG (ref 273–309)
PLATELET COUNT: 381 K/MM3 (ref 150–450)
PROTEIN TOTAL: 7.5 G/DL (ref 6.4–8.9)
RED BLOOD CELL COUNT: 4.91 M/MM3 (ref 4.34–5.6)
RED CELL DISTRIBUTIO: 13 % (ref 11.9–15.9)
THYROID STIMULATING: 0.36 MIU/ML (ref 0.27–4.2)
URIC ACID: 4.8 MG/DL (ref 4.4–7.6)
WHITE BLOOD CELL COU: 13 K/MM3 (ref 4–11)

## 2020-05-04 PROCEDURE — 99223 1ST HOSP IP/OBS HIGH 75: CPT | Performed by: HOSPITALIST

## 2020-05-04 PROCEDURE — 99222 1ST HOSP IP/OBS MODERATE 55: CPT | Performed by: INTERNAL MEDICINE

## 2020-05-04 RX ORDER — ONDANSETRON 4 MG/1
4 TABLET, FILM COATED ORAL EVERY 8 HOURS PRN
Qty: 20 TABLET | Refills: 0 | Status: SHIPPED | OUTPATIENT
Start: 2020-05-04 | End: 2020-05-05 | Stop reason: CLARIF

## 2020-05-05 LAB
*MEAN CORPUSCULAR HGB CONC: 33.9 G/DL (ref 32.5–35.8)
ANION GAP: 11 MEQ/L (ref 6.2–14.7)
BASOPHIL AUTOMATED: 0.6 %
BASOPHILS: 0.1 (ref 0–0.2)
BLOOD UREA NITROGEN (BUN): 12 MG/DL (ref 7–25)
BUN/CREATININE RATIO: 9.8 (ref 7.4–23)
CALCIUM, SERUM: 9.9 MG/DL (ref 8.6–10.3)
CARBON DIOXIDE: 24 MMOL/L (ref 21–31)
CHLORIDE, SERUM: 112 MMOL/L (ref 98–107)
CREATININE: 1.23 MG/DL (ref 0.7–1.3)
EOSINOPHIL AUTOMATED: 2 %
EOSINOPHILS: 0.3 (ref 0–0.5)
EST GLOMERULAR FILTRATION RATE: 60 1.73M SQ
GLUCOSE: 103 MG/DL (ref 70–99)
HEMATOCRIT: 49.7 % (ref 38.6–49.2)
HEMOGLOBIN: 16.8 GM/DL (ref 13–17)
K (POTASSIUM, SERUM): 4.4 MMOL/L (ref 3.5–5.1)
LYMPHOCYTE AUTOMATED: 26.4 %
LYMPHOCYTES: 3.6 (ref 0.6–3.4)
MEAN CORPUSCULAR HGB: 31.5 PG (ref 26–34)
MEAN CORPUSCULAR VOL: 93 FL (ref 80–100)
MEAN PLATELET VOLUME: 6.9 FL (ref 6.8–10.2)
MONOCYTE AUTOMATED: 10.7 %
MONOCYTES: 1.5 (ref 0.3–1)
NA (SODIUM, SERUM): 147 MMOL/L (ref 133–144)
NEUTROPHIL ABSOLUTE: 8.3 (ref 1.7–7.7)
NEUTROPHIL AUTOMATED: 60.3 %
PLATELET COUNT: 430 K/MM3 (ref 150–450)
RED BLOOD CELL COUNT: 5.34 M/MM3 (ref 4.34–5.6)
RED CELL DISTRIBUTIO: 13.9 % (ref 11.9–15.9)
WHITE BLOOD CELL COU: 13.8 K/MM3 (ref 4–11)

## 2020-05-05 PROCEDURE — 99232 SBSQ HOSP IP/OBS MODERATE 35: CPT | Performed by: HOSPITALIST

## 2020-05-05 PROCEDURE — 99232 SBSQ HOSP IP/OBS MODERATE 35: CPT | Performed by: NURSE PRACTITIONER

## 2020-05-05 RX ORDER — PROCHLORPERAZINE MALEATE 10 MG
10 TABLET ORAL EVERY 8 HOURS PRN
Qty: 20 TABLET | Refills: 0 | Status: SHIPPED | OUTPATIENT
Start: 2020-05-05 | End: 2023-05-10

## 2020-05-06 LAB
*MEAN CORPUSCULAR HGB CONC: 34.5 G/DL (ref 32.5–35.8)
ANION GAP: 10 MEQ/L (ref 6.2–14.7)
BASOPHIL AUTOMATED: 1.3 %
BASOPHILS: 0.2 (ref 0–0.2)
BLOOD UREA NITROGEN (BUN): 17 MG/DL (ref 7–25)
BUN/CREATININE RATIO: 12.4 (ref 7.4–23)
CALCIUM, SERUM: 9.5 MG/DL (ref 8.6–10.3)
CARBON DIOXIDE: 27 MMOL/L (ref 21–31)
CHLORIDE, SERUM: 108 MMOL/L (ref 98–107)
CREATININE: 1.37 MG/DL (ref 0.7–1.3)
EOSINOPHIL AUTOMATED: 2.6 %
EOSINOPHILS: 0.3 (ref 0–0.5)
EST GLOMERULAR FILTRATION RATE: 53 1.73M SQ
GLUCOSE: 97 MG/DL (ref 70–99)
HEMATOCRIT: 44.4 % (ref 38.6–49.2)
HEMOGLOBIN: 15.3 GM/DL (ref 13–17)
K (POTASSIUM, SERUM): 4.5 MMOL/L (ref 3.5–5.1)
LYMPHOCYTE AUTOMATED: 25.8 %
LYMPHOCYTES: 3.3 (ref 0.6–3.4)
MEAN CORPUSCULAR HGB: 32.2 PG (ref 26–34)
MEAN CORPUSCULAR VOL: 93.3 FL (ref 80–100)
MEAN PLATELET VOLUME: 7 FL (ref 6.8–10.2)
MONOCYTE AUTOMATED: 7.4 %
MONOCYTES: 1 (ref 0.3–1)
NA (SODIUM, SERUM): 145 MMOL/L (ref 133–144)
NEUTROPHIL ABSOLUTE: 8.2 (ref 1.7–7.7)
NEUTROPHIL AUTOMATED: 62.9 %
PLATELET COUNT: 419 K/MM3 (ref 150–450)
RED BLOOD CELL COUNT: 4.76 M/MM3 (ref 4.34–5.6)
RED CELL DISTRIBUTIO: 13.7 % (ref 11.9–15.9)
WHITE BLOOD CELL COU: 12.9 K/MM3 (ref 4–11)

## 2020-05-06 PROCEDURE — 99232 SBSQ HOSP IP/OBS MODERATE 35: CPT | Performed by: NURSE PRACTITIONER

## 2020-05-06 PROCEDURE — 99232 SBSQ HOSP IP/OBS MODERATE 35: CPT | Performed by: HOSPITALIST

## 2020-05-07 ENCOUNTER — TELEPHONE (OUTPATIENT)
Dept: INTERNAL MEDICINE | Age: 60
End: 2020-05-07

## 2020-05-07 LAB
*MEAN CORPUSCULAR HGB CONC: 33.6 G/DL (ref 32.5–35.8)
ANION GAP: 12 MEQ/L (ref 6.2–14.7)
BASOPHIL AUTOMATED: 0.9 %
BASOPHILS: 0.1 (ref 0–0.2)
BEDSIDE GLUCOSE: 96 MG/DL (ref 70–110)
BLOOD UREA NITROGEN (BUN): 26 MG/DL (ref 7–25)
BUN/CREATININE RATIO: 20.8 (ref 7.4–23)
CALCIUM, SERUM: 8.5 MG/DL (ref 8.6–10.3)
CARBON DIOXIDE: 25 MMOL/L (ref 21–31)
CHLORIDE, SERUM: 106 MMOL/L (ref 98–107)
CREATININE: 1.25 MG/DL (ref 0.7–1.3)
EOSINOPHIL AUTOMATED: 3 %
EOSINOPHILS: 0.3 (ref 0–0.5)
EST GLOMERULAR FILTRATION RATE: 59 1.73M SQ
GLUCOSE: 91 MG/DL (ref 70–99)
HEMATOCRIT: 41.7 % (ref 38.6–49.2)
HEMOGLOBIN: 14 GM/DL (ref 13–17)
K (POTASSIUM, SERUM): 4.9 MMOL/L (ref 3.5–5.1)
LYMPHOCYTE AUTOMATED: 33.2 %
LYMPHOCYTES: 3 (ref 0.6–3.4)
MEAN CORPUSCULAR HGB: 31.1 PG (ref 26–34)
MEAN CORPUSCULAR VOL: 92.7 FL (ref 80–100)
MEAN PLATELET VOLUME: 6.9 FL (ref 6.8–10.2)
MONOCYTE AUTOMATED: 6.5 %
MONOCYTES: 0.6 (ref 0.3–1)
NA (SODIUM, SERUM): 143 MMOL/L (ref 133–144)
NEUTROPHIL ABSOLUTE: 5.2 (ref 1.7–7.7)
NEUTROPHIL AUTOMATED: 56.4 %
PLATELET COUNT: 388 K/MM3 (ref 150–450)
RED BLOOD CELL COUNT: 4.5 M/MM3 (ref 4.34–5.6)
RED CELL DISTRIBUTIO: 13.3 % (ref 11.9–15.9)
WHITE BLOOD CELL COU: 9.2 K/MM3 (ref 4–11)

## 2020-05-07 PROCEDURE — 99239 HOSP IP/OBS DSCHRG MGMT >30: CPT | Performed by: HOSPITALIST

## 2020-05-07 PROCEDURE — 99232 SBSQ HOSP IP/OBS MODERATE 35: CPT | Performed by: INTERNAL MEDICINE

## 2020-05-08 ENCOUNTER — APPOINTMENT (OUTPATIENT)
Dept: INTERNAL MEDICINE | Age: 60
End: 2020-05-08

## 2020-05-08 ENCOUNTER — OFFICE VISIT (OUTPATIENT)
Dept: INTERNAL MEDICINE | Age: 60
End: 2020-05-08

## 2020-05-08 VITALS — HEIGHT: 67 IN | BODY MASS INDEX: 34.53 KG/M2 | WEIGHT: 220 LBS

## 2020-05-08 DIAGNOSIS — E87.1 HYPONATREMIA: Primary | ICD-10-CM

## 2020-05-08 DIAGNOSIS — E03.4 HYPOTHYROIDISM DUE TO ACQUIRED ATROPHY OF THYROID: ICD-10-CM

## 2020-05-08 DIAGNOSIS — H57.9 ITCHY EYES: ICD-10-CM

## 2020-05-08 DIAGNOSIS — E23.0 PANHYPOPITUITARISM (DIABETES INSIPIDUS/ANTERIOR PITUITARY DEFICIENCY) (CMD): ICD-10-CM

## 2020-05-08 PROCEDURE — 99443 TELEPHONE E&M BY PHYSICIAN EST PT NOT ORIG PREV 7 DAYS 21-30 MIN: CPT | Performed by: INTERNAL MEDICINE

## 2020-05-08 RX ORDER — LORATADINE 10 MG/1
10 TABLET ORAL DAILY
Qty: 30 TABLET | Refills: 1 | Status: SHIPPED | OUTPATIENT
Start: 2020-05-08 | End: 2020-07-01

## 2020-05-08 ASSESSMENT — ENCOUNTER SYMPTOMS
TROUBLE SWALLOWING: 0
FATIGUE: 1
RESPIRATORY NEGATIVE: 1
CHILLS: 0
GASTROINTESTINAL NEGATIVE: 1
SINUS PRESSURE: 0
LIGHT-HEADEDNESS: 0
EYE DISCHARGE: 0
DIZZINESS: 0
SORE THROAT: 0
RHINORRHEA: 0
PHOTOPHOBIA: 0
HEADACHES: 0
FEVER: 0
EYE REDNESS: 1

## 2020-05-11 ENCOUNTER — APPOINTMENT (OUTPATIENT)
Dept: INTERNAL MEDICINE | Age: 60
End: 2020-05-11

## 2020-05-14 RX ORDER — PRAVASTATIN SODIUM 10 MG
TABLET ORAL
Qty: 30 TABLET | Refills: 0 | Status: SHIPPED | OUTPATIENT
Start: 2020-05-14 | End: 2020-06-12

## 2020-05-22 RX ORDER — TESTOSTERONE 2 MG/D
PATCH TRANSDERMAL
Qty: 30 PATCH | Refills: 0 | Status: SHIPPED | OUTPATIENT
Start: 2020-05-22 | End: 2020-06-08 | Stop reason: DRUGHIGH

## 2020-05-23 RX ORDER — TESTOSTERONE 2 MG/D
PATCH TRANSDERMAL
Qty: 30 PATCH | Refills: 4 | Status: CANCELLED | OUTPATIENT
Start: 2020-05-23

## 2020-05-28 ENCOUNTER — TELEPHONE (OUTPATIENT)
Dept: INTERNAL MEDICINE | Age: 60
End: 2020-05-28

## 2020-05-29 RX ORDER — HYDROCORTISONE 10 MG/1
TABLET ORAL
Qty: 90 TABLET | Refills: 3 | Status: SHIPPED | OUTPATIENT
Start: 2020-05-29 | End: 2020-05-30

## 2020-05-29 RX ORDER — DESMOPRESSIN ACETATE 0.2 MG/1
TABLET ORAL
Qty: 180 TABLET | Refills: 3 | Status: SHIPPED | OUTPATIENT
Start: 2020-05-29 | End: 2020-05-30

## 2020-05-29 RX ORDER — ESLICARBAZEPINE ACETATE 400 MG/1
TABLET ORAL
Qty: 30 TABLET | Refills: 3 | Status: SHIPPED | OUTPATIENT
Start: 2020-05-29 | End: 2020-05-30

## 2020-05-30 RX ORDER — DESMOPRESSIN ACETATE 0.2 MG/1
TABLET ORAL
Qty: 84 TABLET | Refills: 0 | Status: SHIPPED | OUTPATIENT
Start: 2020-05-30 | End: 2020-09-25

## 2020-05-30 RX ORDER — ESLICARBAZEPINE ACETATE 400 MG/1
TABLET ORAL
Qty: 14 TABLET | Refills: 0 | Status: SHIPPED | OUTPATIENT
Start: 2020-05-30 | End: 2020-09-25

## 2020-05-30 RX ORDER — HYDROCORTISONE 10 MG/1
TABLET ORAL
Qty: 42 TABLET | Refills: 0 | Status: SHIPPED | OUTPATIENT
Start: 2020-05-30 | End: 2020-09-25

## 2020-06-04 ENCOUNTER — LAB SERVICES (OUTPATIENT)
Dept: LAB | Age: 60
End: 2020-06-04

## 2020-06-04 ENCOUNTER — OFFICE VISIT (OUTPATIENT)
Dept: INTERNAL MEDICINE | Age: 60
End: 2020-06-04

## 2020-06-04 VITALS
SYSTOLIC BLOOD PRESSURE: 122 MMHG | WEIGHT: 239 LBS | OXYGEN SATURATION: 95 % | BODY MASS INDEX: 37.51 KG/M2 | HEIGHT: 67 IN | HEART RATE: 91 BPM | DIASTOLIC BLOOD PRESSURE: 78 MMHG | TEMPERATURE: 98.1 F

## 2020-06-04 DIAGNOSIS — E23.0 PANHYPOPITUITARISM (CMD): Primary | ICD-10-CM

## 2020-06-04 DIAGNOSIS — E23.2 DIABETES INSIPIDUS (CMD): ICD-10-CM

## 2020-06-04 DIAGNOSIS — R79.89 LOW TESTOSTERONE LEVEL IN MALE: ICD-10-CM

## 2020-06-04 DIAGNOSIS — R41.89 IMPAIRED COGNITION: ICD-10-CM

## 2020-06-04 DIAGNOSIS — E03.4 HYPOTHYROIDISM DUE TO ACQUIRED ATROPHY OF THYROID: ICD-10-CM

## 2020-06-04 DIAGNOSIS — E23.0 PANHYPOPITUITARISM (CMD): ICD-10-CM

## 2020-06-04 LAB
BUN SERPL-MCNC: 16 MG/DL (ref 6–27)
CALCIUM SERPL-MCNC: 9.4 MG/DL (ref 8.6–10.6)
CHLORIDE SERPL-SCNC: 101 MMOL/L (ref 96–107)
CREAT SERPL-MCNC: 0.9 MG/DL (ref 0.6–1.6)
GFR SERPL CREATININE-BSD FRML MDRD: >60 ML/MIN/{1.73M2}
GFR SERPL CREATININE-BSD FRML MDRD: >60 ML/MIN/{1.73M2}
GLUCOSE SERPL-MCNC: 104 MG/DL (ref 70–200)
HCO3 SERPL-SCNC: 27 MMOL/L (ref 22–32)
POTASSIUM SERPL-SCNC: 4.7 MMOL/L (ref 3.5–5.3)
SODIUM SERPL-SCNC: 137 MMOL/L (ref 136–146)

## 2020-06-04 PROCEDURE — 84403 ASSAY OF TOTAL TESTOSTERONE: CPT | Performed by: INTERNAL MEDICINE

## 2020-06-04 PROCEDURE — 36415 COLL VENOUS BLD VENIPUNCTURE: CPT | Performed by: INTERNAL MEDICINE

## 2020-06-04 PROCEDURE — 80048 BASIC METABOLIC PNL TOTAL CA: CPT | Performed by: INTERNAL MEDICINE

## 2020-06-04 PROCEDURE — 99214 OFFICE O/P EST MOD 30 MIN: CPT | Performed by: INTERNAL MEDICINE

## 2020-06-04 ASSESSMENT — ENCOUNTER SYMPTOMS
HEADACHES: 0
RESPIRATORY NEGATIVE: 1
FEVER: 0
RHINORRHEA: 0
EYE REDNESS: 0
CHILLS: 0
DIAPHORESIS: 0
WEAKNESS: 0
SORE THROAT: 0
FATIGUE: 0
SINUS PRESSURE: 0
EYE DISCHARGE: 0
DIZZINESS: 0
LIGHT-HEADEDNESS: 0
TROUBLE SWALLOWING: 0
PHOTOPHOBIA: 0
GASTROINTESTINAL NEGATIVE: 1
NERVOUS/ANXIOUS: 0

## 2020-06-05 DIAGNOSIS — R79.89 LOW TESTOSTERONE LEVEL IN MALE: Primary | ICD-10-CM

## 2020-06-05 LAB — TESTOST SERPL-MCNC: 161 NG/DL (ref 200–813)

## 2020-06-12 RX ORDER — PRAVASTATIN SODIUM 10 MG
TABLET ORAL
Qty: 14 TABLET | Refills: 11 | Status: SHIPPED | OUTPATIENT
Start: 2020-06-12 | End: 2020-11-25

## 2020-06-12 RX ORDER — LEVOTHYROXINE SODIUM 88 UG/1
TABLET ORAL
Qty: 14 TABLET | Refills: 11 | Status: SHIPPED | OUTPATIENT
Start: 2020-06-12 | End: 2020-11-25

## 2020-06-29 ENCOUNTER — EXTERNAL RECORD (OUTPATIENT)
Dept: HEALTH INFORMATION MANAGEMENT | Facility: OTHER | Age: 60
End: 2020-06-29

## 2020-06-29 PROCEDURE — 99235 HOSP IP/OBS SAME DATE MOD 70: CPT | Performed by: INTERNAL MEDICINE

## 2020-06-30 ENCOUNTER — TELEPHONE (OUTPATIENT)
Dept: INTERNAL MEDICINE | Age: 60
End: 2020-06-30

## 2020-07-01 DIAGNOSIS — H57.9 ITCHY EYES: ICD-10-CM

## 2020-07-01 RX ORDER — LORATADINE 10 MG/1
TABLET ORAL
Qty: 30 TABLET | Refills: 1 | Status: SHIPPED | OUTPATIENT
Start: 2020-07-01 | End: 2020-08-21

## 2020-07-06 DIAGNOSIS — R79.89 LOW TESTOSTERONE LEVEL IN MALE: ICD-10-CM

## 2020-07-07 RX ORDER — TESTOSTERONE 4 MG/D
PATCH TRANSDERMAL
Qty: 30 PATCH | Refills: 0 | Status: SHIPPED | OUTPATIENT
Start: 2020-07-07 | End: 2020-08-04

## 2020-07-23 ENCOUNTER — LAB SERVICES (OUTPATIENT)
Dept: LAB | Age: 60
End: 2020-07-23

## 2020-07-23 ENCOUNTER — OFFICE VISIT (OUTPATIENT)
Dept: INTERNAL MEDICINE | Age: 60
End: 2020-07-23

## 2020-07-23 VITALS
SYSTOLIC BLOOD PRESSURE: 120 MMHG | HEART RATE: 86 BPM | DIASTOLIC BLOOD PRESSURE: 80 MMHG | OXYGEN SATURATION: 96 % | WEIGHT: 244.3 LBS | HEIGHT: 68 IN | BODY MASS INDEX: 37.02 KG/M2 | TEMPERATURE: 98.9 F

## 2020-07-23 DIAGNOSIS — E87.1 HYPONATREMIA: ICD-10-CM

## 2020-07-23 DIAGNOSIS — E03.4 HYPOTHYROIDISM DUE TO ACQUIRED ATROPHY OF THYROID: ICD-10-CM

## 2020-07-23 DIAGNOSIS — R41.89 IMPAIRED COGNITION: ICD-10-CM

## 2020-07-23 DIAGNOSIS — R79.89 LOW TESTOSTERONE LEVEL IN MALE: ICD-10-CM

## 2020-07-23 DIAGNOSIS — R55 SYNCOPE, UNSPECIFIED SYNCOPE TYPE: Primary | ICD-10-CM

## 2020-07-23 DIAGNOSIS — R56.9 SEIZURE (CMD): ICD-10-CM

## 2020-07-23 LAB
BUN SERPL-MCNC: 15 MG/DL (ref 6–27)
CALCIUM SERPL-MCNC: 9.9 MG/DL (ref 8.6–10.6)
CHLORIDE SERPL-SCNC: 98 MMOL/L (ref 96–107)
CREAT SERPL-MCNC: 1 MG/DL (ref 0.6–1.6)
GFR SERPL CREATININE-BSD FRML MDRD: >60 ML/MIN/{1.73M2}
GFR SERPL CREATININE-BSD FRML MDRD: >60 ML/MIN/{1.73M2}
GLUCOSE SERPL-MCNC: 110 MG/DL (ref 70–200)
HCO3 SERPL-SCNC: 29 MMOL/L (ref 22–32)
POTASSIUM SERPL-SCNC: 4.6 MMOL/L (ref 3.5–5.3)
SODIUM SERPL-SCNC: 139 MMOL/L (ref 136–146)

## 2020-07-23 PROCEDURE — 36415 COLL VENOUS BLD VENIPUNCTURE: CPT | Performed by: INTERNAL MEDICINE

## 2020-07-23 PROCEDURE — 80048 BASIC METABOLIC PNL TOTAL CA: CPT | Performed by: INTERNAL MEDICINE

## 2020-07-23 PROCEDURE — 84403 ASSAY OF TOTAL TESTOSTERONE: CPT | Performed by: INTERNAL MEDICINE

## 2020-07-23 PROCEDURE — 99214 OFFICE O/P EST MOD 30 MIN: CPT | Performed by: INTERNAL MEDICINE

## 2020-07-23 ASSESSMENT — ENCOUNTER SYMPTOMS
LIGHT-HEADEDNESS: 1
RESPIRATORY NEGATIVE: 1
SORE THROAT: 0
HEADACHES: 0
EYE DISCHARGE: 0
WEAKNESS: 0
DIAPHORESIS: 0
NERVOUS/ANXIOUS: 0
PHOTOPHOBIA: 0
FATIGUE: 0
EYE REDNESS: 0
FEVER: 0
TROUBLE SWALLOWING: 0
POLYPHAGIA: 0
BACK PAIN: 0
SPEECH DIFFICULTY: 1
DIZZINESS: 0
BRUISES/BLEEDS EASILY: 0
CHILLS: 0
POLYDIPSIA: 0
GASTROINTESTINAL NEGATIVE: 1
RHINORRHEA: 0
SEIZURES: 1
SINUS PRESSURE: 0

## 2020-07-24 LAB — TESTOST SERPL-MCNC: 93 NG/DL (ref 200–813)

## 2020-07-24 ASSESSMENT — ENCOUNTER SYMPTOMS: COLOR CHANGE: 1

## 2020-08-04 DIAGNOSIS — R79.89 LOW TESTOSTERONE LEVEL IN MALE: ICD-10-CM

## 2020-08-04 RX ORDER — TESTOSTERONE 4 MG/D
PATCH TRANSDERMAL
Qty: 30 PATCH | Refills: 4 | Status: SHIPPED | OUTPATIENT
Start: 2020-08-04 | End: 2020-11-25

## 2020-08-21 DIAGNOSIS — H57.9 ITCHY EYES: ICD-10-CM

## 2020-08-21 RX ORDER — LORATADINE 10 MG/1
TABLET ORAL
Qty: 30 TABLET | Refills: 1 | Status: SHIPPED | OUTPATIENT
Start: 2020-08-21 | End: 2020-10-16

## 2020-09-25 RX ORDER — DESMOPRESSIN ACETATE 0.2 MG/1
TABLET ORAL
Qty: 84 TABLET | Refills: 11 | Status: SHIPPED | OUTPATIENT
Start: 2020-09-25 | End: 2021-03-09

## 2020-09-25 RX ORDER — HYDROCORTISONE 10 MG/1
TABLET ORAL
Qty: 42 TABLET | Refills: 11 | Status: SHIPPED | OUTPATIENT
Start: 2020-09-25 | End: 2021-03-09

## 2020-09-25 RX ORDER — ESLICARBAZEPINE ACETATE 400 MG/1
TABLET ORAL
Qty: 14 TABLET | Refills: 11 | Status: SHIPPED | OUTPATIENT
Start: 2020-09-25 | End: 2021-03-09

## 2020-10-16 ENCOUNTER — TELEPHONE (OUTPATIENT)
Dept: INTERNAL MEDICINE | Age: 60
End: 2020-10-16

## 2020-10-16 DIAGNOSIS — H57.9 ITCHY EYES: ICD-10-CM

## 2020-10-16 RX ORDER — LORATADINE 10 MG/1
TABLET ORAL
Qty: 30 TABLET | Refills: 1 | Status: SHIPPED | OUTPATIENT
Start: 2020-10-16 | End: 2020-12-14

## 2020-10-22 ENCOUNTER — APPOINTMENT (OUTPATIENT)
Dept: INTERNAL MEDICINE | Age: 60
End: 2020-10-22

## 2020-11-16 ENCOUNTER — OFFICE VISIT (OUTPATIENT)
Dept: INTERNAL MEDICINE | Age: 60
End: 2020-11-16

## 2020-11-16 VITALS
TEMPERATURE: 98.1 F | HEART RATE: 94 BPM | HEIGHT: 68 IN | OXYGEN SATURATION: 96 % | BODY MASS INDEX: 36.43 KG/M2 | SYSTOLIC BLOOD PRESSURE: 120 MMHG | DIASTOLIC BLOOD PRESSURE: 78 MMHG | WEIGHT: 240.4 LBS

## 2020-11-16 DIAGNOSIS — E87.1 HYPONATREMIA: ICD-10-CM

## 2020-11-16 DIAGNOSIS — E78.2 MIXED HYPERLIPIDEMIA: ICD-10-CM

## 2020-11-16 DIAGNOSIS — E23.2 DIABETES INSIPIDUS (CMD): ICD-10-CM

## 2020-11-16 DIAGNOSIS — R41.89 IMPAIRED COGNITION: ICD-10-CM

## 2020-11-16 DIAGNOSIS — E03.4 HYPOTHYROIDISM DUE TO ACQUIRED ATROPHY OF THYROID: ICD-10-CM

## 2020-11-16 DIAGNOSIS — E23.0 PANHYPOPITUITARISM (CMD): Primary | ICD-10-CM

## 2020-11-16 PROCEDURE — 99214 OFFICE O/P EST MOD 30 MIN: CPT | Performed by: INTERNAL MEDICINE

## 2020-11-16 SDOH — HEALTH STABILITY: MENTAL HEALTH: HOW OFTEN DO YOU HAVE A DRINK CONTAINING ALCOHOL?: NEVER

## 2020-11-16 ASSESSMENT — ENCOUNTER SYMPTOMS
SORE THROAT: 0
PHOTOPHOBIA: 0
EYE REDNESS: 0
DIAPHORESIS: 0
EYE DISCHARGE: 0
RESPIRATORY NEGATIVE: 1
CHILLS: 0
HEADACHES: 0
NERVOUS/ANXIOUS: 0
FATIGUE: 0
SINUS PRESSURE: 0
LIGHT-HEADEDNESS: 0
DIZZINESS: 0
WEAKNESS: 0
RHINORRHEA: 0
FEVER: 0
TROUBLE SWALLOWING: 0
GASTROINTESTINAL NEGATIVE: 1

## 2020-11-17 PROBLEM — E78.2 MIXED HYPERLIPIDEMIA: Status: ACTIVE | Noted: 2020-11-17

## 2020-11-18 ENCOUNTER — TELEPHONE (OUTPATIENT)
Dept: INTERNAL MEDICINE | Age: 60
End: 2020-11-18

## 2020-11-24 DIAGNOSIS — R79.89 LOW TESTOSTERONE LEVEL IN MALE: ICD-10-CM

## 2020-11-25 RX ORDER — LEVOTHYROXINE SODIUM 88 UG/1
TABLET ORAL
Qty: 14 TABLET | Refills: 11 | Status: SHIPPED | OUTPATIENT
Start: 2020-11-25 | End: 2021-05-17

## 2020-11-25 RX ORDER — PRAVASTATIN SODIUM 10 MG
TABLET ORAL
Qty: 14 TABLET | Refills: 11 | Status: SHIPPED | OUTPATIENT
Start: 2020-11-25 | End: 2021-05-17

## 2020-11-25 RX ORDER — TESTOSTERONE 4 MG/D
PATCH TRANSDERMAL
Qty: 30 PATCH | Refills: 4 | Status: SHIPPED | OUTPATIENT
Start: 2020-11-25 | End: 2021-04-12 | Stop reason: SDUPTHER

## 2020-11-27 ENCOUNTER — EXTERNAL RECORD (OUTPATIENT)
Dept: HEALTH INFORMATION MANAGEMENT | Facility: OTHER | Age: 60
End: 2020-11-27

## 2020-12-03 ENCOUNTER — TELEPHONE (OUTPATIENT)
Dept: INTERNAL MEDICINE | Age: 60
End: 2020-12-03

## 2020-12-14 DIAGNOSIS — H57.9 ITCHY EYES: ICD-10-CM

## 2020-12-14 RX ORDER — LORATADINE 10 MG/1
TABLET ORAL
Qty: 30 TABLET | Refills: 3 | Status: SHIPPED | OUTPATIENT
Start: 2020-12-14 | End: 2021-02-05

## 2021-01-01 ENCOUNTER — EXTERNAL RECORD (OUTPATIENT)
Dept: OTHER | Age: 61
End: 2021-01-01

## 2021-01-08 ENCOUNTER — TELEPHONE (OUTPATIENT)
Dept: INTERNAL MEDICINE | Age: 61
End: 2021-01-08

## 2021-01-11 ENCOUNTER — TELEPHONE (OUTPATIENT)
Dept: INTERNAL MEDICINE | Age: 61
End: 2021-01-11

## 2021-01-27 ENCOUNTER — TELEPHONE (OUTPATIENT)
Dept: GASTROENTEROLOGY | Age: 61
End: 2021-01-27

## 2021-01-27 DIAGNOSIS — D12.6 COLON ADENOMAS: Primary | ICD-10-CM

## 2021-02-01 RX ORDER — BISACODYL 5 MG/1
TABLET, DELAYED RELEASE ORAL
Qty: 2 TABLET | Refills: 0 | Status: SHIPPED | OUTPATIENT
Start: 2021-02-01 | End: 2021-03-18

## 2021-02-01 RX ORDER — SIMETHICONE 125 MG
TABLET,CHEWABLE ORAL
Qty: 2 TABLET | Refills: 0 | Status: SHIPPED | OUTPATIENT
Start: 2021-02-01 | End: 2021-03-18

## 2021-02-01 RX ORDER — SODIUM, POTASSIUM,MAG SULFATES 17.5-3.13G
SOLUTION, RECONSTITUTED, ORAL ORAL
Qty: 1 KIT | Refills: 0 | Status: SHIPPED | OUTPATIENT
Start: 2021-02-01 | End: 2021-03-18

## 2021-02-05 DIAGNOSIS — H57.9 ITCHY EYES: ICD-10-CM

## 2021-02-05 RX ORDER — LORATADINE 10 MG/1
TABLET ORAL
Qty: 30 TABLET | Refills: 2 | Status: SHIPPED | OUTPATIENT
Start: 2021-02-05 | End: 2021-04-30

## 2021-02-18 DIAGNOSIS — D12.6 COLON ADENOMAS: Primary | ICD-10-CM

## 2021-02-26 ENCOUNTER — APPOINTMENT (OUTPATIENT)
Dept: LAB | Age: 61
End: 2021-02-26

## 2021-03-02 ENCOUNTER — APPOINTMENT (OUTPATIENT)
Dept: GASTROENTEROLOGY | Age: 61
End: 2021-03-02
Attending: INTERNAL MEDICINE

## 2021-03-09 ENCOUNTER — TELEPHONE (OUTPATIENT)
Dept: GASTROENTEROLOGY | Age: 61
End: 2021-03-09

## 2021-03-09 LAB
COVID-19 RESULT: NOT DETECTED
SARS-COV-2 RNA SPEC QL NAA+PROBE: NOT DETECTED

## 2021-03-09 RX ORDER — DESMOPRESSIN ACETATE 0.2 MG/1
TABLET ORAL
Qty: 84 TABLET | Refills: 0 | Status: SHIPPED | OUTPATIENT
Start: 2021-03-09 | End: 2021-03-26

## 2021-03-09 RX ORDER — ESLICARBAZEPINE ACETATE 400 MG/1
TABLET ORAL
Qty: 14 TABLET | Refills: 10 | Status: SHIPPED | OUTPATIENT
Start: 2021-03-09 | End: 2021-08-06

## 2021-03-09 RX ORDER — HYDROCORTISONE 10 MG/1
TABLET ORAL
Qty: 42 TABLET | Refills: 10 | Status: SHIPPED | OUTPATIENT
Start: 2021-03-09 | End: 2021-08-06

## 2021-03-12 ENCOUNTER — EXTERNAL RECORD (OUTPATIENT)
Dept: GASTROENTEROLOGY | Age: 61
End: 2021-03-12

## 2021-03-12 ENCOUNTER — APPOINTMENT (OUTPATIENT)
Dept: OTHER | Facility: PHYSICIAN GROUP | Age: 61
End: 2021-03-12
Attending: INTERNAL MEDICINE

## 2021-03-12 LAB
BEDSIDE GLUCOSE: 83 MG/DL (ref 70–110)
COLONOSCOPY STUDY: NORMAL

## 2021-03-12 PROCEDURE — 45385 COLONOSCOPY W/LESION REMOVAL: CPT | Performed by: INTERNAL MEDICINE

## 2021-03-12 PROCEDURE — 45380 COLONOSCOPY AND BIOPSY: CPT | Performed by: INTERNAL MEDICINE

## 2021-03-16 ENCOUNTER — DOCUMENTATION (OUTPATIENT)
Dept: GASTROENTEROLOGY | Age: 61
End: 2021-03-16

## 2021-03-17 ENCOUNTER — EXTERNAL RECORD (OUTPATIENT)
Dept: OTHER | Age: 61
End: 2021-03-17

## 2021-03-17 LAB
*MEAN CORPUSCULAR HGB CONC: 34.6 G/DL (ref 32.5–35.8)
A/G RATIO: 1.37 (ref 1.1–2.4)
ALANINE AMINOTRANSFE: 38 U/L (ref 7–52)
ALBUMIN, SERUM (ALB): 4.8 G/DL (ref 3.5–5.7)
ALKALINE PHOSPHATASE (ALK): 85 U/L (ref 34–104)
ANION GAP: 10 MEQ/L (ref 6.2–14.7)
ANION GAP: 7 MEQ/L (ref 6.2–14.7)
APPEARANCE: CLEAR
ASPARTATE AMINOTRANS: 27 U/L (ref 13–39)
BASOPHIL AUTOMATED: 0.7 %
BASOPHILS: 0.1 (ref 0–0.14)
BEDSIDE GLUCOSE: 88 MG/DL (ref 70–110)
BILIRUBIN, TOTAL: 0.8 MG/DL (ref 0.2–0.8)
BILIRUBIN: ABNORMAL
BLOOD UREA NITROGEN (BUN): 16 MG/DL (ref 7–25)
BLOOD UREA NITROGEN (BUN): 16 MG/DL (ref 7–25)
BUN/CREATININE RATIO: 15.4 (ref 7.4–23)
BUN/CREATININE RATIO: 17.2 (ref 7.4–23)
CALCIUM, SERUM: 9.1 MG/DL (ref 8.6–10.3)
CALCIUM, SERUM: 9.4 MG/DL (ref 8.6–10.3)
CARBON DIOXIDE: 26 MEQ/L (ref 21–31)
CARBON DIOXIDE: 27 MEQ/L (ref 21–31)
CHLORIDE, SERUM: 88 MMOL/L (ref 98–107)
CHLORIDE, SERUM: 90 MMOL/L (ref 98–107)
COLOR: ABNORMAL
COVID-19 RAPID RESULT: NEGATIVE
COVID-19 RAPID SOURCE: NORMAL
CREATINE KINASE: 195 U/L (ref 30–223)
CREATININE: 0.93 MG/DL (ref 0.7–1.3)
CREATININE: 1.04 MG/DL (ref 0.7–1.3)
EOSINOPHIL AUTOMATED: 0.9 %
EOSINOPHILS: 0.1 (ref 0–0.6)
EST GLOMERULAR FILTRATION RATE: > 60 ML/MIN
EST GLOMERULAR FILTRATION RATE: > 60 ML/MIN
GLUCOSE, URINE, AUTOMATED: ABNORMAL MG/DL
GLUCOSE: 120 MG/DL (ref 70–99)
GLUCOSE: 134 MG/DL (ref 70–99)
HEMATOCRIT: 46.6 % (ref 38.6–49.2)
HEMOGLOBIN: 16.1 GM/DL (ref 13–17)
K (POTASSIUM, SERUM): 3.5 MMOL/L (ref 3.5–5.2)
K (POTASSIUM, SERUM): 3.7 MMOL/L (ref 3.5–5.2)
KETONES, URINE, AUTOMATED: 20 MG/DL
LEUKOCYTE, URINE, AUTOMATED: NEGATIVE
LYMPHOCYTE AUTOMATED: 15 %
LYMPHOCYTES: 1.7 (ref 0.78–3.73)
MEAN CORPUSCULAR HGB: 30.9 PG (ref 26–34)
MEAN CORPUSCULAR VOL: 89.2 FL (ref 80–100)
MEAN PLATELET VOLUME: 7.3 FL (ref 6.8–10.2)
MG (MAGNESIUM, SERUM: 2.1 MG/DL (ref 1.6–2.6)
MONOCYTE AUTOMATED: 11.6 %
MONOCYTES: 1.3 (ref 0.17–1)
NA (SODIUM, SERUM): 124 MMOL/L (ref 133–144)
NA (SODIUM, SERUM): 124 MMOL/L (ref 133–144)
NEUTROPHIL ABSOLUTE: 8.3 (ref 1.91–7.6)
NEUTROPHIL AUTOMATED: 71.8 %
NITRITE, URINE AUTOMATED: NEGATIVE
PH, URINE: 6 (ref 5–8)
PLATELET COUNT: 345 K/MM3 (ref 150–450)
PROTEIN TOTAL: 8.3 G/DL (ref 6.4–8.9)
PROTEIN, URINE: 100 MG/DL
RBC: NORMAL /HPF (ref 0–2)
RED BLOOD CELL COUNT: 5.23 M/MM3 (ref 4.34–5.6)
RED CELL DISTRIBUTIO: 13.1 % (ref 11.9–15.9)
SPECIFIC GRAVITY UA: 1.03 (ref 1–1.03)
TROPONIN I HIGH SENSITIVITY: 6 PG/ML (ref 0–20)
URINE, BLOOD, AUTOMATED: ABNORMAL
UROBILINOGEN, URINE, AUTOMATED: ABNORMAL MG/DL
WBC: NORMAL /HPF (ref 0–5)
WHITE BLOOD CELL COU: 11.5 K/MM3 (ref 4–11)

## 2021-03-17 PROCEDURE — 99223 1ST HOSP IP/OBS HIGH 75: CPT | Performed by: HOSPITALIST

## 2021-03-17 PROCEDURE — 93010 ELECTROCARDIOGRAM REPORT: CPT | Performed by: INTERNAL MEDICINE

## 2021-03-17 PROCEDURE — 99222 1ST HOSP IP/OBS MODERATE 55: CPT | Performed by: INTERNAL MEDICINE

## 2021-03-18 ENCOUNTER — EXTERNAL RECORD (OUTPATIENT)
Dept: NEPHROLOGY | Age: 61
End: 2021-03-18

## 2021-03-18 LAB
*MEAN CORPUSCULAR HGB CONC: 32.9 G/DL (ref 32.5–35.8)
ANION GAP: 8 MEQ/L (ref 6.2–14.7)
ANION GAP: 9 MEQ/L (ref 6.2–14.7)
BASOPHIL AUTOMATED: 0.6 %
BASOPHILS: 0 (ref 0–0.14)
BLOOD UREA NITROGEN (BUN): 10 MG/DL (ref 7–25)
BLOOD UREA NITROGEN (BUN): 14 MG/DL (ref 7–25)
BUN/CREATININE RATIO: 10.8 (ref 7.4–23)
BUN/CREATININE RATIO: 15.7 (ref 7.4–23)
CALCIUM, SERUM: 8.7 MG/DL (ref 8.6–10.3)
CALCIUM, SERUM: 9.2 MG/DL (ref 8.6–10.3)
CARBON DIOXIDE: 25 MEQ/L (ref 21–31)
CARBON DIOXIDE: 25 MEQ/L (ref 21–31)
CHLORIDE, RANDOM URINE: 32 MEQ/L (ref 110–250)
CHLORIDE, SERUM: 93 MMOL/L (ref 98–107)
CHLORIDE, SERUM: 94 MMOL/L (ref 98–107)
CREATININE, RANDOM URINE: 406.1 MG/DL (ref 16–241)
CREATININE: 0.89 MG/DL (ref 0.7–1.3)
CREATININE: 0.93 MG/DL (ref 0.7–1.3)
EOSINOPHIL AUTOMATED: 1.9 %
EOSINOPHILS: 0.2 (ref 0–0.6)
EST GLOMERULAR FILTRATION RATE: > 60 ML/MIN
EST GLOMERULAR FILTRATION RATE: > 60 ML/MIN
GLUCOSE: 114 MG/DL (ref 70–99)
GLUCOSE: 90 MG/DL (ref 70–99)
HEMATOCRIT: 43.5 % (ref 38.6–49.2)
HEMOGLOBIN: 14.3 GM/DL (ref 13–17)
K (POTASSIUM, SERUM): 3.4 MMOL/L (ref 3.5–5.2)
K (POTASSIUM, SERUM): 3.7 MMOL/L (ref 3.5–5.2)
LYMPHOCYTE AUTOMATED: 30.6 %
LYMPHOCYTES: 2.6 (ref 0.78–3.73)
MEAN CORPUSCULAR HGB: 30.3 PG (ref 26–34)
MEAN CORPUSCULAR VOL: 92.2 FL (ref 80–100)
MEAN PLATELET VOLUME: 7 FL (ref 6.8–10.2)
MONOCYTE AUTOMATED: 11.5 %
MONOCYTES: 1 (ref 0.17–1)
NA (SODIUM, SERUM): 126 MMOL/L (ref 133–144)
NA (SODIUM, SERUM): 128 MMOL/L (ref 133–144)
NEUTROPHIL ABSOLUTE: 4.8 (ref 1.91–7.6)
NEUTROPHIL AUTOMATED: 55.4 %
PLATELET COUNT: 326 K/MM3 (ref 150–450)
POTASSIUM, RANDOM URINE: 56.1 MEQ/L (ref 25–125)
RED BLOOD CELL COUNT: 4.72 M/MM3 (ref 4.34–5.6)
RED CELL DISTRIBUTIO: 12.9 % (ref 11.9–15.9)
SODIUM, RANDOM URINE: 26.3 MEQ/L (ref 40–220)
WHITE BLOOD CELL COU: 8.6 K/MM3 (ref 4–11)

## 2021-03-18 PROCEDURE — 99233 SBSQ HOSP IP/OBS HIGH 50: CPT | Performed by: HOSPITALIST

## 2021-03-18 PROCEDURE — 99232 SBSQ HOSP IP/OBS MODERATE 35: CPT | Performed by: INTERNAL MEDICINE

## 2021-03-19 LAB
*MEAN CORPUSCULAR HGB CONC: 33.8 G/DL (ref 32.5–35.8)
ANION GAP: 10 MEQ/L (ref 6.2–14.7)
BASOPHIL AUTOMATED: 0.8 %
BASOPHILS: 0.1 (ref 0–0.14)
BLOOD UREA NITROGEN (BUN): 8 MG/DL (ref 7–25)
BUN/CREATININE RATIO: 7.1 (ref 7.4–23)
CALCIUM, SERUM: 9.8 MG/DL (ref 8.6–10.3)
CARBON DIOXIDE: 24 MEQ/L (ref 21–31)
CHLORIDE, SERUM: 103 MMOL/L (ref 98–107)
CREATININE: 1.12 MG/DL (ref 0.7–1.3)
EOSINOPHIL AUTOMATED: 1.6 %
EOSINOPHILS: 0.2 (ref 0–0.6)
EST GLOMERULAR FILTRATION RATE: > 60 ML/MIN
GLUCOSE: 111 MG/DL (ref 70–99)
HEMATOCRIT: 48 % (ref 38.6–49.2)
HEMOGLOBIN: 16.2 GM/DL (ref 13–17)
K (POTASSIUM, SERUM): 4.1 MMOL/L (ref 3.5–5.2)
LYMPHOCYTE AUTOMATED: 28 %
LYMPHOCYTES: 3.3 (ref 0.78–3.73)
MEAN CORPUSCULAR HGB: 30.9 PG (ref 26–34)
MEAN CORPUSCULAR VOL: 91.5 FL (ref 80–100)
MEAN PLATELET VOLUME: 7.2 FL (ref 6.8–10.2)
MONOCYTE AUTOMATED: 11.6 %
MONOCYTES: 1.4 (ref 0.17–1)
NA (SODIUM, SERUM): 137 MMOL/L (ref 133–144)
NEUTROPHIL ABSOLUTE: 6.8 (ref 1.91–7.6)
NEUTROPHIL AUTOMATED: 58 %
PLATELET COUNT: 349 K/MM3 (ref 150–450)
RED BLOOD CELL COUNT: 5.25 M/MM3 (ref 4.34–5.6)
RED CELL DISTRIBUTIO: 13.4 % (ref 11.9–15.9)
WHITE BLOOD CELL COU: 11.8 K/MM3 (ref 4–11)

## 2021-03-19 PROCEDURE — 99233 SBSQ HOSP IP/OBS HIGH 50: CPT | Performed by: HOSPITALIST

## 2021-03-19 PROCEDURE — 99232 SBSQ HOSP IP/OBS MODERATE 35: CPT | Performed by: INTERNAL MEDICINE

## 2021-03-20 LAB
*MEAN CORPUSCULAR HGB CONC: 33.9 G/DL (ref 32.5–35.8)
ANION GAP: 11 MEQ/L (ref 6.2–14.7)
BASOPHIL AUTOMATED: 0.3 %
BASOPHILS: 0 (ref 0–0.14)
BLOOD UREA NITROGEN (BUN): 11 MG/DL (ref 7–25)
BUN/CREATININE RATIO: 9.1 (ref 7.4–23)
CALCIUM, SERUM: 10.1 MG/DL (ref 8.6–10.3)
CARBON DIOXIDE: 26 MEQ/L (ref 21–31)
CHLORIDE, SERUM: 101 MMOL/L (ref 98–107)
CREATININE: 1.21 MG/DL (ref 0.7–1.3)
EOSINOPHIL AUTOMATED: 2 %
EOSINOPHILS: 0.2 (ref 0–0.6)
EST GLOMERULAR FILTRATION RATE: > 60 ML/MIN
GLUCOSE: 103 MG/DL (ref 70–99)
HEMATOCRIT: 48.5 % (ref 38.6–49.2)
HEMOGLOBIN: 16.4 GM/DL (ref 13–17)
K (POTASSIUM, SERUM): 4.1 MMOL/L (ref 3.5–5.2)
LYMPHOCYTE AUTOMATED: 33.5 %
LYMPHOCYTES: 3.8 (ref 0.78–3.73)
MEAN CORPUSCULAR HGB: 31 PG (ref 26–34)
MEAN CORPUSCULAR VOL: 91.5 FL (ref 80–100)
MEAN PLATELET VOLUME: 7.5 FL (ref 6.8–10.2)
MONOCYTE AUTOMATED: 12.7 %
MONOCYTES: 1.4 (ref 0.17–1)
NA (SODIUM, SERUM): 138 MMOL/L (ref 133–144)
NEUTROPHIL ABSOLUTE: 5.8 (ref 1.91–7.6)
NEUTROPHIL AUTOMATED: 51.5 %
PLATELET COUNT: 342 K/MM3 (ref 150–450)
RED BLOOD CELL COUNT: 5.31 M/MM3 (ref 4.34–5.6)
RED CELL DISTRIBUTIO: 13.4 % (ref 11.9–15.9)
WHITE BLOOD CELL COU: 11.3 K/MM3 (ref 4–11)

## 2021-03-20 PROCEDURE — 99239 HOSP IP/OBS DSCHRG MGMT >30: CPT | Performed by: HOSPITALIST

## 2021-03-20 PROCEDURE — 99232 SBSQ HOSP IP/OBS MODERATE 35: CPT | Performed by: INTERNAL MEDICINE

## 2021-03-21 LAB
ANION GAP: 10 MEQ/L (ref 6.2–14.7)
BLOOD UREA NITROGEN (BUN): 14 MG/DL (ref 7–25)
BUN/CREATININE RATIO: 11.2 (ref 7.4–23)
CALCIUM, SERUM: 9.6 MG/DL (ref 8.6–10.3)
CARBON DIOXIDE: 28 MEQ/L (ref 21–31)
CHLORIDE, SERUM: 100 MMOL/L (ref 98–107)
CHRONIC KIDNEY DISEASE STAGE: ABNORMAL
CREATININE: 1.25 MG/DL (ref 0.7–1.3)
EST GLOMERULAR FILTRATION RATE: 59 ML/MIN
GLUCOSE: 104 MG/DL (ref 70–99)
K (POTASSIUM, SERUM): 4.2 MMOL/L (ref 3.5–5.2)
NA (SODIUM, SERUM): 138 MMOL/L (ref 133–144)

## 2021-03-21 PROCEDURE — 99232 SBSQ HOSP IP/OBS MODERATE 35: CPT | Performed by: INTERNAL MEDICINE

## 2021-03-21 PROCEDURE — 99233 SBSQ HOSP IP/OBS HIGH 50: CPT | Performed by: HOSPITALIST

## 2021-03-22 LAB
*MEAN CORPUSCULAR HGB CONC: 34.2 G/DL (ref 32.5–35.8)
ANION GAP: 9 MEQ/L (ref 6.2–14.7)
BASOPHIL AUTOMATED: 1.2 %
BASOPHILS: 0.1 (ref 0–0.14)
BLOOD UREA NITROGEN (BUN): 13 MG/DL (ref 7–25)
BUN/CREATININE RATIO: 11.5 (ref 7.4–23)
CALCIUM, SERUM: 9.3 MG/DL (ref 8.6–10.3)
CARBON DIOXIDE: 26 MEQ/L (ref 21–31)
CHLORIDE, SERUM: 101 MMOL/L (ref 98–107)
CREATININE: 1.13 MG/DL (ref 0.7–1.3)
EOSINOPHIL AUTOMATED: 3.3 %
EOSINOPHILS: 0.3 (ref 0–0.6)
EST GLOMERULAR FILTRATION RATE: > 60 ML/MIN
GLUCOSE: 97 MG/DL (ref 70–99)
HEMATOCRIT: 42.9 % (ref 38.6–49.2)
HEMOGLOBIN: 14.7 GM/DL (ref 13–17)
K (POTASSIUM, SERUM): 4.1 MMOL/L (ref 3.5–5.2)
LYMPHOCYTE AUTOMATED: 39.6 %
LYMPHOCYTES: 3.5 (ref 0.78–3.73)
MEAN CORPUSCULAR HGB: 31.2 PG (ref 26–34)
MEAN CORPUSCULAR VOL: 91.1 FL (ref 80–100)
MEAN PLATELET VOLUME: 7 FL (ref 6.8–10.2)
MONOCYTE AUTOMATED: 8.8 %
MONOCYTES: 0.8 (ref 0.17–1)
NA (SODIUM, SERUM): 136 MMOL/L (ref 133–144)
NEUTROPHIL ABSOLUTE: 4.2 (ref 1.91–7.6)
NEUTROPHIL AUTOMATED: 47.1 %
PLATELET COUNT: 310 K/MM3 (ref 150–450)
RED BLOOD CELL COUNT: 4.71 M/MM3 (ref 4.34–5.6)
RED CELL DISTRIBUTIO: 13 % (ref 11.9–15.9)
WHITE BLOOD CELL COU: 8.9 K/MM3 (ref 4–11)

## 2021-03-22 PROCEDURE — 99232 SBSQ HOSP IP/OBS MODERATE 35: CPT | Performed by: HOSPITALIST

## 2021-03-22 PROCEDURE — 99232 SBSQ HOSP IP/OBS MODERATE 35: CPT | Performed by: INTERNAL MEDICINE

## 2021-03-23 ENCOUNTER — EXTERNAL RECORD (OUTPATIENT)
Dept: OTHER | Age: 61
End: 2021-03-23

## 2021-03-23 ENCOUNTER — TELEPHONE (OUTPATIENT)
Dept: SCHEDULING | Age: 61
End: 2021-03-23

## 2021-03-23 ENCOUNTER — TRANSCRIBE ORDERS (OUTPATIENT)
Dept: POST ACUTE CARE | Age: 61
End: 2021-03-23

## 2021-03-23 LAB
ANION GAP: 8 MEQ/L (ref 6.2–14.7)
BLOOD UREA NITROGEN (BUN): 19 MG/DL (ref 7–25)
BUN/CREATININE RATIO: 17 (ref 7.4–23)
CALCIUM, SERUM: 8.8 MG/DL (ref 8.6–10.3)
CARBON DIOXIDE: 26 MEQ/L (ref 21–31)
CHLORIDE, SERUM: 100 MMOL/L (ref 98–107)
CREATININE: 1.12 MG/DL (ref 0.7–1.3)
EST GLOMERULAR FILTRATION RATE: > 60 ML/MIN
GLUCOSE: 90 MG/DL (ref 70–99)
K (POTASSIUM, SERUM): 4 MMOL/L (ref 3.5–5.2)
NA (SODIUM, SERUM): 134 MMOL/L (ref 133–144)

## 2021-03-23 PROCEDURE — 99231 SBSQ HOSP IP/OBS SF/LOW 25: CPT | Performed by: INTERNAL MEDICINE

## 2021-03-23 PROCEDURE — 99239 HOSP IP/OBS DSCHRG MGMT >30: CPT | Performed by: HOSPITALIST

## 2021-03-24 ENCOUNTER — HOME/GROUP HOME VISIT (OUTPATIENT)
Dept: POST ACUTE CARE | Age: 61
End: 2021-03-24

## 2021-03-24 DIAGNOSIS — E87.1 HYPONATREMIA: ICD-10-CM

## 2021-03-24 DIAGNOSIS — E23.2 DIABETES INSIPIDUS (CMD): ICD-10-CM

## 2021-03-24 DIAGNOSIS — E23.0 PANHYPOPITUITARISM (CMD): Primary | ICD-10-CM

## 2021-03-24 DIAGNOSIS — R41.89 IMPAIRED COGNITION: ICD-10-CM

## 2021-03-24 PROCEDURE — 1170F FXNL STATUS ASSESSED: CPT | Performed by: NURSE PRACTITIONER

## 2021-03-24 PROCEDURE — 1126F AMNT PAIN NOTED NONE PRSNT: CPT | Performed by: NURSE PRACTITIONER

## 2021-03-24 PROCEDURE — 99308 SBSQ NF CARE LOW MDM 20: CPT | Performed by: NURSE PRACTITIONER

## 2021-03-26 ENCOUNTER — NURSING HOME VISIT (OUTPATIENT)
Dept: NEPHROLOGY | Age: 61
End: 2021-03-26

## 2021-03-26 ENCOUNTER — NURSING HOME VISIT (OUTPATIENT)
Dept: POST ACUTE CARE | Age: 61
End: 2021-03-26

## 2021-03-26 VITALS
RESPIRATION RATE: 17 BRPM | TEMPERATURE: 98.2 F | SYSTOLIC BLOOD PRESSURE: 162 MMHG | DIASTOLIC BLOOD PRESSURE: 94 MMHG | HEART RATE: 64 BPM | OXYGEN SATURATION: 94 %

## 2021-03-26 VITALS
RESPIRATION RATE: 17 BRPM | TEMPERATURE: 98.2 F | OXYGEN SATURATION: 94 % | WEIGHT: 250 LBS | DIASTOLIC BLOOD PRESSURE: 94 MMHG | HEIGHT: 67 IN | BODY MASS INDEX: 39.24 KG/M2 | SYSTOLIC BLOOD PRESSURE: 162 MMHG | HEART RATE: 64 BPM

## 2021-03-26 DIAGNOSIS — I10 ESSENTIAL HYPERTENSION: Primary | ICD-10-CM

## 2021-03-26 DIAGNOSIS — R53.81 DEBILITY: ICD-10-CM

## 2021-03-26 DIAGNOSIS — E23.2 DIABETES INSIPIDUS (CMD): ICD-10-CM

## 2021-03-26 DIAGNOSIS — E87.1 HYPONATREMIA: Primary | ICD-10-CM

## 2021-03-26 DIAGNOSIS — R42 DIZZINESS: ICD-10-CM

## 2021-03-26 DIAGNOSIS — R56.9 SEIZURE (CMD): ICD-10-CM

## 2021-03-26 DIAGNOSIS — N25.1 DIABETES INSIPIDUS, NEPHROGENIC (CMD): ICD-10-CM

## 2021-03-26 DIAGNOSIS — R41.89 IMPAIRED COGNITION: ICD-10-CM

## 2021-03-26 DIAGNOSIS — E23.0 PANHYPOPITUITARISM (CMD): ICD-10-CM

## 2021-03-26 DIAGNOSIS — E03.4 HYPOTHYROIDISM DUE TO ACQUIRED ATROPHY OF THYROID: ICD-10-CM

## 2021-03-26 PROCEDURE — 99306 1ST NF CARE HIGH MDM 50: CPT | Performed by: FAMILY MEDICINE

## 2021-03-26 PROCEDURE — 1126F AMNT PAIN NOTED NONE PRSNT: CPT | Performed by: FAMILY MEDICINE

## 2021-03-26 PROCEDURE — 1170F FXNL STATUS ASSESSED: CPT | Performed by: FAMILY MEDICINE

## 2021-03-26 PROCEDURE — 99309 SBSQ NF CARE MODERATE MDM 30: CPT | Performed by: NURSE PRACTITIONER

## 2021-03-26 PROCEDURE — 99497 ADVNCD CARE PLAN 30 MIN: CPT | Performed by: FAMILY MEDICINE

## 2021-03-26 RX ORDER — DESMOPRESSIN ACETATE 0.2 MG/1
TABLET ORAL
Qty: 84 TABLET | Refills: 10 | Status: SHIPPED | OUTPATIENT
Start: 2021-03-26 | End: 2021-08-19

## 2021-03-26 SDOH — HEALTH STABILITY: MENTAL HEALTH: HOW OFTEN DO YOU HAVE A DRINK CONTAINING ALCOHOL?: NEVER

## 2021-03-26 ASSESSMENT — ENCOUNTER SYMPTOMS
BACK PAIN: 0
WHEEZING: 0
SORE THROAT: 0
FEVER: 0
NAUSEA: 0
DIAPHORESIS: 0
SINUS PAIN: 0
NAUSEA: 0
POLYDIPSIA: 0
FEVER: 0
VOMITING: 0
AGITATION: 0
ABDOMINAL DISTENTION: 0
SHORTNESS OF BREATH: 0
DIZZINESS: 1
ABDOMINAL PAIN: 0
SHORTNESS OF BREATH: 0
EYE REDNESS: 0
COLOR CHANGE: 0
CONSTIPATION: 0
CONSTIPATION: 0
SINUS PRESSURE: 0
EYE DISCHARGE: 0
SLEEP DISTURBANCE: 0
VOMITING: 0
STRIDOR: 0
WEAKNESS: 1
NERVOUS/ANXIOUS: 0
LIGHT-HEADEDNESS: 0
DIARRHEA: 0
DIARRHEA: 0
POLYPHAGIA: 0
TROUBLE SWALLOWING: 0
COUGH: 0
DIZZINESS: 0
ACTIVITY CHANGE: 1
PHOTOPHOBIA: 0
CHILLS: 0
CHILLS: 0
HEADACHES: 0
COUGH: 0
RHINORRHEA: 0
HEADACHES: 0

## 2021-03-29 ENCOUNTER — HOME/GROUP HOME VISIT (OUTPATIENT)
Dept: POST ACUTE CARE | Age: 61
End: 2021-03-29

## 2021-03-29 ENCOUNTER — TELEPHONE (OUTPATIENT)
Dept: SCHEDULING | Age: 61
End: 2021-03-29

## 2021-03-29 DIAGNOSIS — E23.0 PANHYPOPITUITARISM (CMD): ICD-10-CM

## 2021-03-29 DIAGNOSIS — G40.219 PARTIAL EPILEPSY WITH IMPAIRMENT OF CONSCIOUSNESS, INTRACTABLE (CMD): ICD-10-CM

## 2021-03-29 DIAGNOSIS — E23.2 DIABETES INSIPIDUS (CMD): ICD-10-CM

## 2021-03-29 DIAGNOSIS — E87.1 HYPONATREMIA: Primary | ICD-10-CM

## 2021-03-29 PROCEDURE — 1170F FXNL STATUS ASSESSED: CPT | Performed by: NURSE PRACTITIONER

## 2021-03-29 PROCEDURE — 99310 SBSQ NF CARE HIGH MDM 45: CPT | Performed by: NURSE PRACTITIONER

## 2021-03-30 ASSESSMENT — ENCOUNTER SYMPTOMS
ABDOMINAL PAIN: 0
DIAPHORESIS: 0
CONSTIPATION: 0
VOMITING: 0
CHILLS: 0
SHORTNESS OF BREATH: 0
TROUBLE SWALLOWING: 0
EYE DISCHARGE: 0
ABDOMINAL DISTENTION: 0
HEADACHES: 0
NAUSEA: 0
DIZZINESS: 0
COUGH: 0
FEVER: 0
DIARRHEA: 0

## 2021-03-31 ENCOUNTER — NURSING HOME VISIT (OUTPATIENT)
Dept: POST ACUTE CARE | Age: 61
End: 2021-03-31

## 2021-03-31 VITALS
TEMPERATURE: 97.7 F | OXYGEN SATURATION: 93 % | HEART RATE: 84 BPM | SYSTOLIC BLOOD PRESSURE: 126 MMHG | RESPIRATION RATE: 17 BRPM | DIASTOLIC BLOOD PRESSURE: 68 MMHG

## 2021-03-31 DIAGNOSIS — E23.0 PANHYPOPITUITARISM (CMD): ICD-10-CM

## 2021-03-31 DIAGNOSIS — R42 DIZZINESS: ICD-10-CM

## 2021-03-31 DIAGNOSIS — R56.9 SEIZURE (CMD): ICD-10-CM

## 2021-03-31 DIAGNOSIS — E23.2 DIABETES INSIPIDUS (CMD): ICD-10-CM
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
## 2021-03-31 DIAGNOSIS — E03.4 HYPOTHYROIDISM DUE TO ACQUIRED ATROPHY OF THYROID: ICD-10-CM

## 2021-03-31 DIAGNOSIS — R53.81 DEBILITY: Primary | ICD-10-CM

## 2021-03-31 DIAGNOSIS — R41.89 IMPAIRED COGNITION: ICD-10-CM

## 2021-03-31 PROCEDURE — 99310 SBSQ NF CARE HIGH MDM 45: CPT | Performed by: FAMILY MEDICINE

## 2021-03-31 ASSESSMENT — ENCOUNTER SYMPTOMS
SLEEP DISTURBANCE: 0
WEAKNESS: 1
ACTIVITY CHANGE: 1
COUGH: 0
SINUS PRESSURE: 0
PHOTOPHOBIA: 0
NAUSEA: 0
FEVER: 0
SHORTNESS OF BREATH: 0
POLYDIPSIA: 0
VOMITING: 0
NERVOUS/ANXIOUS: 0
STRIDOR: 0
HEADACHES: 0
SORE THROAT: 0
CHILLS: 0
DIZZINESS: 1
CONSTIPATION: 0
POLYPHAGIA: 0
DIARRHEA: 0
BACK PAIN: 0
AGITATION: 0
COLOR CHANGE: 0
LIGHT-HEADEDNESS: 0
RHINORRHEA: 0
EYE REDNESS: 0
WHEEZING: 0
SINUS PAIN: 0

## 2021-04-01 ENCOUNTER — V-VISIT (OUTPATIENT)
Dept: POST ACUTE CARE | Age: 61
End: 2021-04-01

## 2021-04-01 DIAGNOSIS — E78.2 MIXED HYPERLIPIDEMIA: ICD-10-CM

## 2021-04-01 DIAGNOSIS — R55 SYNCOPE, UNSPECIFIED SYNCOPE TYPE: ICD-10-CM

## 2021-04-01 DIAGNOSIS — D33.2 BENIGN NEOPLASM OF BRAIN, UNSPECIFIED BRAIN REGION (CMD): ICD-10-CM

## 2021-04-01 DIAGNOSIS — R41.89 IMPAIRED COGNITION: ICD-10-CM

## 2021-04-01 DIAGNOSIS — R79.89 LOW TESTOSTERONE LEVEL IN MALE: ICD-10-CM

## 2021-04-01 DIAGNOSIS — R53.81 DEBILITY: Primary | ICD-10-CM

## 2021-04-01 DIAGNOSIS — R56.9 SEIZURE (CMD): ICD-10-CM

## 2021-04-01 DIAGNOSIS — E23.2 DIABETES INSIPIDUS (CMD): ICD-10-CM

## 2021-04-01 DIAGNOSIS — R42 DIZZINESS: ICD-10-CM

## 2021-04-01 DIAGNOSIS — E03.4 HYPOTHYROIDISM DUE TO ACQUIRED ATROPHY OF THYROID: ICD-10-CM

## 2021-04-01 DIAGNOSIS — E23.0 PANHYPOPITUITARISM (CMD): ICD-10-CM

## 2021-04-01 PROBLEM — E87.1 HYPONATREMIA: Status: RESOLVED | Noted: 2021-03-26 | Resolved: 2021-04-01

## 2021-04-01 PROCEDURE — 1126F AMNT PAIN NOTED NONE PRSNT: CPT | Performed by: NURSE PRACTITIONER

## 2021-04-01 PROCEDURE — 1170F FXNL STATUS ASSESSED: CPT | Performed by: NURSE PRACTITIONER

## 2021-04-01 PROCEDURE — 99306 1ST NF CARE HIGH MDM 50: CPT | Performed by: NURSE PRACTITIONER

## 2021-04-05 ENCOUNTER — V-VISIT (OUTPATIENT)
Dept: POST ACUTE CARE | Age: 61
End: 2021-04-05

## 2021-04-05 DIAGNOSIS — R53.81 DEBILITY: Primary | ICD-10-CM

## 2021-04-05 DIAGNOSIS — R79.89 LOW TESTOSTERONE LEVEL IN MALE: ICD-10-CM

## 2021-04-05 DIAGNOSIS — R56.9 SEIZURE (CMD): ICD-10-CM

## 2021-04-05 DIAGNOSIS — E23.0 PANHYPOPITUITARISM (CMD): ICD-10-CM

## 2021-04-05 DIAGNOSIS — G40.219 PARTIAL EPILEPSY WITH IMPAIRMENT OF CONSCIOUSNESS, INTRACTABLE (CMD): ICD-10-CM

## 2021-04-05 DIAGNOSIS — R41.89 IMPAIRED COGNITION: Primary | ICD-10-CM

## 2021-04-05 DIAGNOSIS — R41.89 IMPAIRED COGNITION: ICD-10-CM

## 2021-04-05 DIAGNOSIS — E03.4 HYPOTHYROIDISM DUE TO ACQUIRED ATROPHY OF THYROID: ICD-10-CM

## 2021-04-05 DIAGNOSIS — R55 SYNCOPE, UNSPECIFIED SYNCOPE TYPE: ICD-10-CM

## 2021-04-05 PROCEDURE — 99315 NF DSCHRG MGMT 30 MIN/LESS: CPT | Performed by: NURSE PRACTITIONER

## 2021-04-05 PROCEDURE — 1126F AMNT PAIN NOTED NONE PRSNT: CPT | Performed by: NURSE PRACTITIONER

## 2021-04-05 PROCEDURE — 1170F FXNL STATUS ASSESSED: CPT | Performed by: NURSE PRACTITIONER

## 2021-04-12 ENCOUNTER — NURSING HOME VISIT (OUTPATIENT)
Dept: NEPHROLOGY | Age: 61
End: 2021-04-12

## 2021-04-12 ENCOUNTER — TELEPHONE (OUTPATIENT)
Dept: SCHEDULING | Age: 61
End: 2021-04-12

## 2021-04-12 VITALS
OXYGEN SATURATION: 94 % | HEART RATE: 65 BPM | TEMPERATURE: 97.5 F | RESPIRATION RATE: 16 BRPM | DIASTOLIC BLOOD PRESSURE: 81 MMHG | SYSTOLIC BLOOD PRESSURE: 124 MMHG

## 2021-04-12 DIAGNOSIS — E87.1 HYPONATREMIA: Primary | ICD-10-CM

## 2021-04-12 PROCEDURE — 99309 SBSQ NF CARE MODERATE MDM 30: CPT | Performed by: NURSE PRACTITIONER

## 2021-04-12 RX ORDER — TESTOSTERONE 4 MG/D
1 PATCH TRANSDERMAL DAILY
Qty: 30 PATCH | Refills: 0 | Status: SHIPPED | OUTPATIENT
Start: 2021-04-12 | End: 2021-05-18

## 2021-04-12 RX ORDER — CLONAZEPAM 0.5 MG/1
0.5 TABLET ORAL DAILY
Qty: 30 TABLET | Refills: 0 | Status: SHIPPED | OUTPATIENT
Start: 2021-04-12 | End: 2022-01-01 | Stop reason: SDUPTHER

## 2021-04-15 DIAGNOSIS — R79.89 LOW TESTOSTERONE LEVEL IN MALE: ICD-10-CM

## 2021-04-20 RX ORDER — TESTOSTERONE 4 MG/D
1 PATCH TRANSDERMAL DAILY
Qty: 30 PATCH | Refills: 5 | Status: CANCELLED | OUTPATIENT
Start: 2021-04-20

## 2021-04-29 DIAGNOSIS — H57.9 ITCHY EYES: ICD-10-CM

## 2021-04-30 RX ORDER — LORATADINE 10 MG/1
TABLET ORAL
Qty: 30 TABLET | Refills: 5 | Status: SHIPPED | OUTPATIENT
Start: 2021-04-30 | End: 2021-10-15

## 2021-05-17 ENCOUNTER — TELEPHONE (OUTPATIENT)
Dept: INTERNAL MEDICINE | Age: 61
End: 2021-05-17

## 2021-05-17 DIAGNOSIS — Z12.11 SCREENING FOR COLORECTAL CANCER: Primary | ICD-10-CM

## 2021-05-17 DIAGNOSIS — E78.2 MIXED HYPERLIPIDEMIA: ICD-10-CM

## 2021-05-17 DIAGNOSIS — Z12.5 SCREENING FOR PROSTATE CANCER: ICD-10-CM

## 2021-05-17 DIAGNOSIS — R79.89 LOW TESTOSTERONE LEVEL IN MALE: ICD-10-CM

## 2021-05-17 DIAGNOSIS — Z12.12 SCREENING FOR COLORECTAL CANCER: Primary | ICD-10-CM

## 2021-05-17 DIAGNOSIS — E03.4 HYPOTHYROIDISM DUE TO ACQUIRED ATROPHY OF THYROID: ICD-10-CM

## 2021-05-17 RX ORDER — PRAVASTATIN SODIUM 10 MG
TABLET ORAL
Qty: 30 TABLET | Refills: 0 | Status: SHIPPED | OUTPATIENT
Start: 2021-05-17 | End: 2021-06-03

## 2021-05-17 RX ORDER — LEVOTHYROXINE SODIUM 88 UG/1
TABLET ORAL
Qty: 30 TABLET | Refills: 0 | Status: SHIPPED | OUTPATIENT
Start: 2021-05-17 | End: 2021-06-03

## 2021-05-18 RX ORDER — TESTOSTERONE 4 MG/D
1 PATCH TRANSDERMAL DAILY
Qty: 30 PATCH | Refills: 0 | Status: SHIPPED | OUTPATIENT
Start: 2021-05-18 | End: 2021-06-15

## 2021-06-03 RX ORDER — LEVOTHYROXINE SODIUM 88 UG/1
TABLET ORAL
Qty: 30 TABLET | Refills: 1 | Status: SHIPPED | OUTPATIENT
Start: 2021-06-03 | End: 2021-07-24

## 2021-06-03 RX ORDER — PRAVASTATIN SODIUM 10 MG
TABLET ORAL
Qty: 30 TABLET | Refills: 1 | Status: SHIPPED | OUTPATIENT
Start: 2021-06-03 | End: 2021-07-24

## 2021-06-10 ENCOUNTER — TELEPHONE (OUTPATIENT)
Dept: INTERNAL MEDICINE | Age: 61
End: 2021-06-10

## 2021-06-10 DIAGNOSIS — D49.2 ABNORMAL SKIN GROWTH: Primary | ICD-10-CM

## 2021-06-15 DIAGNOSIS — R79.89 LOW TESTOSTERONE LEVEL IN MALE: ICD-10-CM

## 2021-06-15 RX ORDER — TESTOSTERONE 4 MG/D
1 PATCH TRANSDERMAL DAILY
Qty: 30 PATCH | Refills: 2 | Status: SHIPPED | OUTPATIENT
Start: 2021-06-15 | End: 2021-08-10

## 2021-07-06 ENCOUNTER — OFFICE VISIT (OUTPATIENT)
Dept: INTERNAL MEDICINE | Age: 61
End: 2021-07-06

## 2021-07-06 VITALS
BODY MASS INDEX: 36.75 KG/M2 | WEIGHT: 242.5 LBS | SYSTOLIC BLOOD PRESSURE: 126 MMHG | HEART RATE: 77 BPM | DIASTOLIC BLOOD PRESSURE: 72 MMHG | OXYGEN SATURATION: 96 % | HEIGHT: 68 IN | TEMPERATURE: 97.4 F

## 2021-07-06 DIAGNOSIS — Z12.11 SCREEN FOR COLON CANCER: ICD-10-CM

## 2021-07-06 DIAGNOSIS — Z00.00 ROUTINE GENERAL MEDICAL EXAMINATION AT A HEALTH CARE FACILITY: Primary | ICD-10-CM

## 2021-07-06 DIAGNOSIS — Z12.5 SCREENING FOR PROSTATE CANCER: ICD-10-CM

## 2021-07-06 DIAGNOSIS — E23.2 DIABETES INSIPIDUS (CMD): ICD-10-CM

## 2021-07-06 DIAGNOSIS — E23.0 PANHYPOPITUITARISM (CMD): ICD-10-CM

## 2021-07-06 DIAGNOSIS — E87.1 HYPONATREMIA: ICD-10-CM

## 2021-07-06 DIAGNOSIS — E78.2 MIXED HYPERLIPIDEMIA: ICD-10-CM

## 2021-07-06 DIAGNOSIS — E03.4 HYPOTHYROIDISM DUE TO ACQUIRED ATROPHY OF THYROID: ICD-10-CM

## 2021-07-06 DIAGNOSIS — R41.89 IMPAIRED COGNITION: ICD-10-CM

## 2021-07-06 PROCEDURE — 99214 OFFICE O/P EST MOD 30 MIN: CPT | Performed by: INTERNAL MEDICINE

## 2021-07-06 PROCEDURE — G0439 PPPS, SUBSEQ VISIT: HCPCS | Performed by: INTERNAL MEDICINE

## 2021-07-06 ASSESSMENT — ENCOUNTER SYMPTOMS
SINUS PRESSURE: 0
LIGHT-HEADEDNESS: 0
EYE REDNESS: 0
FATIGUE: 0
RHINORRHEA: 0
WEAKNESS: 0
SORE THROAT: 0
NERVOUS/ANXIOUS: 0
HEADACHES: 0
DIAPHORESIS: 0
TROUBLE SWALLOWING: 0
CHILLS: 0
PHOTOPHOBIA: 0
FEVER: 0
GASTROINTESTINAL NEGATIVE: 1
EYE DISCHARGE: 0
RESPIRATORY NEGATIVE: 1
DIZZINESS: 0

## 2021-07-06 ASSESSMENT — PATIENT HEALTH QUESTIONNAIRE - PHQ9
2. FEELING DOWN, DEPRESSED OR HOPELESS: NOT AT ALL
CLINICAL INTERPRETATION OF PHQ9 SCORE: NO FURTHER SCREENING NEEDED
SUM OF ALL RESPONSES TO PHQ9 QUESTIONS 1 AND 2: 0
CLINICAL INTERPRETATION OF PHQ2 SCORE: NO FURTHER SCREENING NEEDED
SUM OF ALL RESPONSES TO PHQ9 QUESTIONS 1 AND 2: 0
1. LITTLE INTEREST OR PLEASURE IN DOING THINGS: NOT AT ALL

## 2021-07-07 ENCOUNTER — OFFICE VISIT (OUTPATIENT)
Dept: DERMATOLOGY | Age: 61
End: 2021-07-07

## 2021-07-07 DIAGNOSIS — L82.1 SEBORRHEIC KERATOSIS: Primary | ICD-10-CM

## 2021-07-07 PROCEDURE — 99202 OFFICE O/P NEW SF 15 MIN: CPT | Performed by: DERMATOLOGY

## 2021-07-08 ENCOUNTER — LAB SERVICES (OUTPATIENT)
Dept: LAB | Age: 61
End: 2021-07-08

## 2021-07-08 DIAGNOSIS — Z12.11 SCREENING FOR COLORECTAL CANCER: ICD-10-CM

## 2021-07-08 DIAGNOSIS — E03.4 HYPOTHYROIDISM DUE TO ACQUIRED ATROPHY OF THYROID: ICD-10-CM

## 2021-07-08 DIAGNOSIS — Z12.12 SCREENING FOR COLORECTAL CANCER: ICD-10-CM

## 2021-07-08 DIAGNOSIS — R79.89 LOW TESTOSTERONE LEVEL IN MALE: ICD-10-CM

## 2021-07-08 DIAGNOSIS — E78.2 MIXED HYPERLIPIDEMIA: ICD-10-CM

## 2021-07-08 DIAGNOSIS — Z12.5 SCREENING FOR PROSTATE CANCER: ICD-10-CM

## 2021-07-08 LAB
ALBUMIN SERPL-MCNC: 4.4 G/DL (ref 3.6–5.1)
ALP SERPL-CCNC: 76 U/L (ref 45–115)
ALT SERPL W/O P-5'-P-CCNC: 27 U/L (ref 5–49)
AST SERPL-CCNC: 36 U/L (ref 14–43)
BASOPHIL %: 1 % (ref 0–1.2)
BASOPHIL ABSOLUTE #: 0.1 10*3/UL (ref 0–0.1)
BILIRUB SERPL-MCNC: 0.7 MG/DL (ref 0–1.3)
BUN SERPL-MCNC: 16 MG/DL (ref 6–27)
CALCIUM SERPL-MCNC: 9.4 MG/DL (ref 8.6–10.6)
CHLORIDE SERPL-SCNC: 104 MMOL/L (ref 96–107)
CHOLEST SERPL-MCNC: 167 MG/DL (ref 140–200)
CO2 SERPL-SCNC: 31 MMOL/L (ref 22–32)
CREAT SERPL-MCNC: 1 MG/DL (ref 0.6–1.6)
DIFFERENTIAL TYPE: ABNORMAL
EOSINOPHIL %: 2.2 % (ref 0–10)
EOSINOPHIL ABSOLUTE #: 0.2 10*3/UL (ref 0–0.5)
GFR SERPL CREATININE-BSD FRML MDRD: >60 ML/MIN/{1.73M2}
GFR SERPL CREATININE-BSD FRML MDRD: >60 ML/MIN/{1.73M2}
GLUCOSE P FAST SERPL-MCNC: 84 MG/DL (ref 60–100)
HDLC SERPL-MCNC: 52 MG/DL
HEMATOCRIT: 40.2 % (ref 40–51)
HEMOCCULT STL QL: POSITIVE
HEMOGLOBIN: 13.3 G/DL (ref 13.7–17.5)
IMMATURE GRANULOCYTE ABSOLUTE: 0.04 10*3/UL (ref 0–0.05)
IMMATURE GRANULOCYTE PERCENT: 0.4 % (ref 0–0.5)
LDLC SERPL CALC-MCNC: 96 MG/DL (ref 30–100)
LYMPH PERCENT: 32.4 % (ref 20.5–51.1)
LYMPHOCYTE ABSOLUTE #: 3 10*3/UL (ref 1.2–3.4)
MEAN CORPUSCULAR HGB CONCENTRATION: 33.1 % (ref 32–36)
MEAN CORPUSCULAR HGB: 31.4 PG (ref 27–34)
MEAN CORPUSCULAR VOLUME: 94.8 FL (ref 79–95)
MEAN PLATELET VOLUME: 9.4 FL (ref 8.6–12.4)
MONOCYTE ABSOLUTE #: 1 10*3/UL (ref 0.2–0.9)
MONOCYTE PERCENT: 10.8 % (ref 4.3–12.9)
NEUTROPHIL ABSOLUTE #: 4.9 10*3/UL (ref 1.4–6.5)
NEUTROPHIL PERCENT: 53.2 % (ref 34–73.5)
PLATELET COUNT: 367 10*3/UL (ref 150–400)
POTASSIUM SERPL-SCNC: 4.4 MMOL/L (ref 3.5–5.3)
PROT SERPL-MCNC: 7.5 G/DL (ref 6.4–8.5)
PSA SERPL-MCNC: <0.06 NG/ML (ref 0–3.8)
RED BLOOD CELL COUNT: 4.24 10*6/UL (ref 3.9–5.7)
RED CELL DISTRIBUTION WIDTH: 13.1 % (ref 11.3–14.8)
SODIUM SERPL-SCNC: 143 MMOL/L (ref 136–146)
TESTOST SERPL-MCNC: 75 NG/DL (ref 200–813)
TRIGL SERPL-MCNC: 93 MG/DL (ref 0–200)
TSH SERPL DL<=0.05 MIU/L-ACNC: 1.12 M[IU]/L (ref 0.3–4.82)
WHITE BLOOD CELL COUNT: 9.2 10*3/UL (ref 4–10)

## 2021-07-08 PROCEDURE — G0103 PSA SCREENING: HCPCS | Performed by: INTERNAL MEDICINE

## 2021-07-08 PROCEDURE — 85025 COMPLETE CBC W/AUTO DIFF WBC: CPT | Performed by: INTERNAL MEDICINE

## 2021-07-08 PROCEDURE — 36415 COLL VENOUS BLD VENIPUNCTURE: CPT | Performed by: INTERNAL MEDICINE

## 2021-07-08 PROCEDURE — 80061 LIPID PANEL: CPT | Performed by: INTERNAL MEDICINE

## 2021-07-08 PROCEDURE — G0328 FECAL BLOOD SCRN IMMUNOASSAY: HCPCS | Performed by: INTERNAL MEDICINE

## 2021-07-08 PROCEDURE — 84443 ASSAY THYROID STIM HORMONE: CPT | Performed by: INTERNAL MEDICINE

## 2021-07-08 PROCEDURE — 84403 ASSAY OF TOTAL TESTOSTERONE: CPT | Performed by: INTERNAL MEDICINE

## 2021-07-08 PROCEDURE — 80053 COMPREHEN METABOLIC PANEL: CPT | Performed by: INTERNAL MEDICINE

## 2021-07-09 DIAGNOSIS — R19.5 OCCULT BLOOD POSITIVE STOOL: Primary | ICD-10-CM

## 2021-07-13 RX ORDER — TESTOSTERONE 2 MG/D
1 PATCH TRANSDERMAL DAILY
Qty: 30 PATCH | Refills: 2 | Status: CANCELLED | OUTPATIENT
Start: 2021-07-13

## 2021-07-14 ENCOUNTER — OFFICE VISIT (OUTPATIENT)
Dept: GASTROENTEROLOGY | Age: 61
End: 2021-07-14

## 2021-07-14 VITALS — HEIGHT: 68 IN | BODY MASS INDEX: 36.75 KG/M2 | WEIGHT: 242.5 LBS

## 2021-07-14 DIAGNOSIS — K92.1 BLOOD IN STOOL: Primary | ICD-10-CM

## 2021-07-14 PROCEDURE — 99214 OFFICE O/P EST MOD 30 MIN: CPT | Performed by: INTERNAL MEDICINE

## 2021-07-14 ASSESSMENT — ENCOUNTER SYMPTOMS
CONFUSION: 0
DIAPHORESIS: 0
BLOOD IN STOOL: 1
COLOR CHANGE: 0
RECTAL PAIN: 0
SORE THROAT: 0
LIGHT-HEADEDNESS: 0
NAUSEA: 0
CHILLS: 0
CHEST TIGHTNESS: 0
EYE REDNESS: 0
SEIZURES: 0
SHORTNESS OF BREATH: 0
EYE PAIN: 0
VOMITING: 0
BRUISES/BLEEDS EASILY: 0
CHOKING: 0
COUGH: 0
TREMORS: 0
ABDOMINAL PAIN: 0
DIARRHEA: 0
BACK PAIN: 0
SPEECH DIFFICULTY: 0
HEADACHES: 0
CONSTIPATION: 0
ABDOMINAL DISTENTION: 0
UNEXPECTED WEIGHT CHANGE: 0
ANAL BLEEDING: 0
APPETITE CHANGE: 0
FATIGUE: 0

## 2021-07-17 RX ORDER — TESTOSTERONE 2 MG/D
1 PATCH TRANSDERMAL DAILY
Qty: 30 PATCH | Refills: 1 | Status: SHIPPED | OUTPATIENT
Start: 2021-07-17 | End: 2021-09-13

## 2021-07-24 RX ORDER — PRAVASTATIN SODIUM 10 MG
TABLET ORAL
Qty: 30 TABLET | Refills: 3 | Status: SHIPPED | OUTPATIENT
Start: 2021-07-24 | End: 2021-11-09

## 2021-07-24 RX ORDER — LEVOTHYROXINE SODIUM 88 UG/1
TABLET ORAL
Qty: 30 TABLET | Refills: 3 | Status: SHIPPED | OUTPATIENT
Start: 2021-07-24 | End: 2021-11-09

## 2021-08-06 RX ORDER — ESLICARBAZEPINE ACETATE 400 MG/1
TABLET ORAL
Qty: 14 TABLET | Refills: 10 | Status: SHIPPED | OUTPATIENT
Start: 2021-08-06 | End: 2022-01-14

## 2021-08-06 RX ORDER — HYDROCORTISONE 10 MG/1
TABLET ORAL
Qty: 42 TABLET | Refills: 10 | Status: SHIPPED | OUTPATIENT
Start: 2021-08-06 | End: 2022-01-07

## 2021-08-10 DIAGNOSIS — R79.89 LOW TESTOSTERONE LEVEL IN MALE: ICD-10-CM

## 2021-08-10 RX ORDER — TESTOSTERONE 4 MG/D
1 PATCH TRANSDERMAL DAILY
Qty: 30 PATCH | Refills: 4 | Status: SHIPPED | OUTPATIENT
Start: 2021-08-10 | End: 2021-12-28

## 2021-08-11 RX ORDER — TESTOSTERONE 2 MG/D
PATCH TRANSDERMAL
Qty: 30 PATCH | Refills: 0 | Status: CANCELLED | OUTPATIENT
Start: 2021-08-11

## 2021-08-19 RX ORDER — DESMOPRESSIN ACETATE 0.2 MG/1
TABLET ORAL
Qty: 180 TABLET | Refills: 1 | Status: SHIPPED | OUTPATIENT
Start: 2021-08-19 | End: 2021-10-15

## 2021-09-14 RX ORDER — TESTOSTERONE 2 MG/D
PATCH TRANSDERMAL
Qty: 30 PATCH | Refills: 3 | Status: SHIPPED | OUTPATIENT
Start: 2021-09-14 | End: 2021-12-08

## 2021-09-22 ENCOUNTER — TELEPHONE (OUTPATIENT)
Dept: INTERNAL MEDICINE | Age: 61
End: 2021-09-22

## 2021-09-28 ENCOUNTER — OFFICE VISIT (OUTPATIENT)
Dept: INTERNAL MEDICINE | Age: 61
End: 2021-09-28

## 2021-09-28 VITALS
SYSTOLIC BLOOD PRESSURE: 122 MMHG | BODY MASS INDEX: 36 KG/M2 | HEART RATE: 87 BPM | HEIGHT: 68 IN | TEMPERATURE: 97.5 F | OXYGEN SATURATION: 95 % | DIASTOLIC BLOOD PRESSURE: 78 MMHG | WEIGHT: 237.5 LBS

## 2021-09-28 DIAGNOSIS — Z01.818 PREOP EXAMINATION: Primary | ICD-10-CM

## 2021-09-28 DIAGNOSIS — E23.0 PANHYPOPITUITARISM (CMD): ICD-10-CM

## 2021-09-28 DIAGNOSIS — Z91.030 HISTORY OF BEE STING ALLERGY: ICD-10-CM

## 2021-09-28 DIAGNOSIS — R56.9 SEIZURE (CMD): ICD-10-CM

## 2021-09-28 DIAGNOSIS — E03.4 HYPOTHYROIDISM DUE TO ACQUIRED ATROPHY OF THYROID: ICD-10-CM

## 2021-09-28 DIAGNOSIS — K04.7 DENTAL ABSCESS: ICD-10-CM

## 2021-09-28 PROCEDURE — 99214 OFFICE O/P EST MOD 30 MIN: CPT | Performed by: INTERNAL MEDICINE

## 2021-09-28 ASSESSMENT — ENCOUNTER SYMPTOMS
EYE REDNESS: 0
ACTIVITY CHANGE: 0
HEADACHES: 0
PHOTOPHOBIA: 0
FATIGUE: 0
RESPIRATORY NEGATIVE: 1
FEVER: 0
DIZZINESS: 0
DIAPHORESIS: 0
SORE THROAT: 0
EYE DISCHARGE: 0
WEAKNESS: 0
SINUS PRESSURE: 0
GASTROINTESTINAL NEGATIVE: 1
TROUBLE SWALLOWING: 0
BACK PAIN: 0
LIGHT-HEADEDNESS: 0
CHILLS: 0
NERVOUS/ANXIOUS: 0

## 2021-09-30 ENCOUNTER — TELEPHONE (OUTPATIENT)
Dept: INTERNAL MEDICINE | Age: 61
End: 2021-09-30

## 2021-10-15 DIAGNOSIS — H57.9 ITCHY EYES: ICD-10-CM

## 2021-10-15 RX ORDER — LORATADINE 10 MG/1
TABLET ORAL
Qty: 30 TABLET | Refills: 3 | Status: SHIPPED | OUTPATIENT
Start: 2021-10-15 | End: 2022-02-04

## 2021-10-15 RX ORDER — DESMOPRESSIN ACETATE 0.2 MG/1
TABLET ORAL
Qty: 180 TABLET | Refills: 3 | Status: SHIPPED | OUTPATIENT
Start: 2021-10-15 | End: 2022-02-04

## 2021-11-09 RX ORDER — LEVOTHYROXINE SODIUM 88 UG/1
TABLET ORAL
Qty: 30 TABLET | Refills: 3 | Status: SHIPPED | OUTPATIENT
Start: 2021-11-09 | End: 2022-03-03

## 2021-11-09 RX ORDER — PRAVASTATIN SODIUM 10 MG
TABLET ORAL
Qty: 30 TABLET | Refills: 3 | Status: SHIPPED | OUTPATIENT
Start: 2021-11-09 | End: 2022-03-03

## 2021-11-11 ENCOUNTER — TELEPHONE (OUTPATIENT)
Dept: INTERNAL MEDICINE | Age: 61
End: 2021-11-11

## 2021-12-08 RX ORDER — TESTOSTERONE 2 MG/D
PATCH TRANSDERMAL
Qty: 30 PATCH | Refills: 0 | Status: SHIPPED | OUTPATIENT
Start: 2021-12-08 | End: 2022-03-04

## 2021-12-27 DIAGNOSIS — R79.89 LOW TESTOSTERONE LEVEL IN MALE: ICD-10-CM

## 2021-12-28 RX ORDER — TESTOSTERONE 4 MG/D
1 PATCH TRANSDERMAL DAILY
Qty: 30 PATCH | Refills: 0 | Status: SHIPPED | OUTPATIENT
Start: 2021-12-28 | End: 2022-04-01

## 2022-01-01 ENCOUNTER — NURSE ONLY (OUTPATIENT)
Dept: INTERNAL MEDICINE | Age: 62
End: 2022-01-01

## 2022-01-01 ENCOUNTER — APPOINTMENT (OUTPATIENT)
Dept: INTERNAL MEDICINE | Age: 62
End: 2022-01-01

## 2022-01-01 ENCOUNTER — LAB SERVICES (OUTPATIENT)
Dept: LAB | Age: 62
End: 2022-01-01

## 2022-01-01 ENCOUNTER — TELEPHONE (OUTPATIENT)
Dept: INTERNAL MEDICINE | Age: 62
End: 2022-01-01

## 2022-01-01 ENCOUNTER — WALK IN (OUTPATIENT)
Dept: URGENT CARE | Age: 62
End: 2022-01-01

## 2022-01-01 ENCOUNTER — OFFICE VISIT (OUTPATIENT)
Dept: INTERNAL MEDICINE | Age: 62
End: 2022-01-01

## 2022-01-01 VITALS
RESPIRATION RATE: 20 BRPM | HEART RATE: 65 BPM | SYSTOLIC BLOOD PRESSURE: 136 MMHG | DIASTOLIC BLOOD PRESSURE: 82 MMHG | TEMPERATURE: 97.4 F | OXYGEN SATURATION: 98 %

## 2022-01-01 VITALS
TEMPERATURE: 96.6 F | BODY MASS INDEX: 38.68 KG/M2 | OXYGEN SATURATION: 96 % | DIASTOLIC BLOOD PRESSURE: 82 MMHG | HEIGHT: 68 IN | WEIGHT: 255.2 LBS | SYSTOLIC BLOOD PRESSURE: 130 MMHG | HEART RATE: 70 BPM

## 2022-01-01 VITALS
OXYGEN SATURATION: 96 % | SYSTOLIC BLOOD PRESSURE: 120 MMHG | TEMPERATURE: 97.6 F | RESPIRATION RATE: 18 BRPM | DIASTOLIC BLOOD PRESSURE: 84 MMHG | HEART RATE: 84 BPM

## 2022-01-01 VITALS
OXYGEN SATURATION: 97 % | DIASTOLIC BLOOD PRESSURE: 80 MMHG | SYSTOLIC BLOOD PRESSURE: 130 MMHG | RESPIRATION RATE: 18 BRPM | HEART RATE: 80 BPM | TEMPERATURE: 96.8 F

## 2022-01-01 VITALS
RESPIRATION RATE: 18 BRPM | DIASTOLIC BLOOD PRESSURE: 88 MMHG | TEMPERATURE: 97.4 F | HEART RATE: 62 BPM | SYSTOLIC BLOOD PRESSURE: 148 MMHG | OXYGEN SATURATION: 99 %

## 2022-01-01 DIAGNOSIS — R79.89 LOW TESTOSTERONE LEVEL IN MALE: ICD-10-CM

## 2022-01-01 DIAGNOSIS — R79.89 LOW TESTOSTERONE LEVEL IN MALE: Primary | ICD-10-CM

## 2022-01-01 DIAGNOSIS — S81.811A LACERATION OF RIGHT LOWER LEG, INITIAL ENCOUNTER: ICD-10-CM

## 2022-01-01 DIAGNOSIS — E78.2 MIXED HYPERLIPIDEMIA: ICD-10-CM

## 2022-01-01 DIAGNOSIS — Z48.02 VISIT FOR SUTURE REMOVAL: Primary | ICD-10-CM

## 2022-01-01 DIAGNOSIS — R79.89 LOW TESTOSTERONE: Primary | ICD-10-CM

## 2022-01-01 DIAGNOSIS — L71.9 ROSACEA: Primary | ICD-10-CM

## 2022-01-01 DIAGNOSIS — E23.0 PANHYPOPITUITARISM (CMD): ICD-10-CM

## 2022-01-01 DIAGNOSIS — R79.89 LOW VITAMIN D LEVEL: ICD-10-CM

## 2022-01-01 DIAGNOSIS — H57.9 ITCHY EYES: ICD-10-CM

## 2022-01-01 DIAGNOSIS — E03.4 HYPOTHYROIDISM DUE TO ACQUIRED ATROPHY OF THYROID: ICD-10-CM

## 2022-01-01 DIAGNOSIS — E66.09 CLASS 2 OBESITY DUE TO EXCESS CALORIES WITH BODY MASS INDEX (BMI) OF 38.0 TO 38.9 IN ADULT, UNSPECIFIED WHETHER SERIOUS COMORBIDITY PRESENT: ICD-10-CM

## 2022-01-01 DIAGNOSIS — R79.89 LOW TESTOSTERONE: ICD-10-CM

## 2022-01-01 DIAGNOSIS — E23.0 PANHYPOPITUITARISM (CMD): Primary | ICD-10-CM

## 2022-01-01 DIAGNOSIS — L03.115 CELLULITIS OF RIGHT LOWER EXTREMITY: Primary | ICD-10-CM

## 2022-01-01 DIAGNOSIS — E66.01 SEVERE OBESITY (BMI 35.0-39.9) WITH COMORBIDITY (CMD): ICD-10-CM

## 2022-01-01 LAB
25(OH)D3 SERPL-MCNC: 22.8 NG/ML (ref 30–100)
ALBUMIN SERPL-MCNC: 4.2 G/DL (ref 3.6–5.1)
ALP SERPL-CCNC: 62 U/L (ref 45–115)
ALT SERPL W/O P-5'-P-CCNC: 33 U/L (ref 5–49)
AST SERPL-CCNC: 26 U/L (ref 14–43)
BILIRUB SERPL-MCNC: 0.4 MG/DL (ref 0–1.3)
BUN SERPL-MCNC: 18 MG/DL (ref 6–27)
CALCIUM SERPL-MCNC: 8.9 MG/DL (ref 8.6–10.6)
CHLORIDE SERPL-SCNC: 101 MMOL/L (ref 96–107)
CHOLEST SERPL-MCNC: 188 MG/DL (ref 140–200)
CO2 SERPL-SCNC: 31 MMOL/L (ref 22–32)
CREAT SERPL-MCNC: 1.1 MG/DL (ref 0.6–1.6)
GFR SERPL CREATININE-BSD FRML MDRD: >60 ML/MIN/{1.73M2}
GFR SERPL CREATININE-BSD FRML MDRD: >60 ML/MIN/{1.73M2}
GLUCOSE P FAST SERPL-MCNC: 88 MG/DL (ref 60–100)
HDLC SERPL-MCNC: 45 MG/DL
LDLC SERPL CALC-MCNC: 123 MG/DL (ref 30–100)
POTASSIUM SERPL-SCNC: 4 MMOL/L (ref 3.5–5.3)
PROT SERPL-MCNC: 7.1 G/DL (ref 6.4–8.5)
SODIUM SERPL-SCNC: 139 MMOL/L (ref 136–146)
TESTOST SERPL-MCNC: 247 NG/DL (ref 200–813)
TESTOST SERPL-MCNC: 38 NG/DL (ref 200–813)
TRIGL SERPL-MCNC: 100 MG/DL (ref 0–200)
TSH SERPL DL<=0.05 MIU/L-ACNC: 1.12 M[IU]/L (ref 0.3–4.82)

## 2022-01-01 PROCEDURE — 96372 THER/PROPH/DIAG INJ SC/IM: CPT | Performed by: INTERNAL MEDICINE

## 2022-01-01 PROCEDURE — 99214 OFFICE O/P EST MOD 30 MIN: CPT | Performed by: EMERGENCY MEDICINE

## 2022-01-01 PROCEDURE — 84403 ASSAY OF TOTAL TESTOSTERONE: CPT | Performed by: INTERNAL MEDICINE

## 2022-01-01 PROCEDURE — 99214 OFFICE O/P EST MOD 30 MIN: CPT | Performed by: INTERNAL MEDICINE

## 2022-01-01 PROCEDURE — 99213 OFFICE O/P EST LOW 20 MIN: CPT | Performed by: FAMILY MEDICINE

## 2022-01-01 PROCEDURE — 82306 VITAMIN D 25 HYDROXY: CPT | Performed by: INTERNAL MEDICINE

## 2022-01-01 PROCEDURE — X1094 NO CHARGE VISIT: HCPCS | Performed by: INTERNAL MEDICINE

## 2022-01-01 PROCEDURE — 80053 COMPREHEN METABOLIC PANEL: CPT | Performed by: INTERNAL MEDICINE

## 2022-01-01 PROCEDURE — 99214 OFFICE O/P EST MOD 30 MIN: CPT | Performed by: FAMILY MEDICINE

## 2022-01-01 PROCEDURE — 80061 LIPID PANEL: CPT | Performed by: INTERNAL MEDICINE

## 2022-01-01 PROCEDURE — 36415 COLL VENOUS BLD VENIPUNCTURE: CPT | Performed by: INTERNAL MEDICINE

## 2022-01-01 PROCEDURE — X1094 NO CHARGE VISIT: HCPCS | Performed by: FAMILY MEDICINE

## 2022-01-01 PROCEDURE — 84443 ASSAY THYROID STIM HORMONE: CPT | Performed by: INTERNAL MEDICINE

## 2022-01-01 RX ORDER — PRAVASTATIN SODIUM 10 MG
TABLET ORAL
Qty: 14 TABLET | Refills: 0 | Status: CANCELLED | OUTPATIENT
Start: 2022-01-01

## 2022-01-01 RX ORDER — TESTOSTERONE CYPIONATE 100 MG/ML
150 INJECTION, SOLUTION INTRAMUSCULAR
Status: DISCONTINUED | OUTPATIENT
Start: 2022-01-01 | End: 2022-01-01

## 2022-01-01 RX ORDER — PRAVASTATIN SODIUM 10 MG
TABLET ORAL
Qty: 14 TABLET | Refills: 10 | Status: CANCELLED | OUTPATIENT
Start: 2022-01-01

## 2022-01-01 RX ORDER — CLINDAMYCIN HYDROCHLORIDE 300 MG/1
CAPSULE ORAL
Qty: 30 CAPSULE | Refills: 10 | Status: CANCELLED | OUTPATIENT
Start: 2022-01-01

## 2022-01-01 RX ORDER — HYDROCORTISONE 10 MG/1
TABLET ORAL
Qty: 42 TABLET | Refills: 10 | Status: CANCELLED | OUTPATIENT
Start: 2022-01-01

## 2022-01-01 RX ORDER — TESTOSTERONE 2 MG/D
PATCH TRANSDERMAL
Qty: 30 PATCH | Refills: 0 | Status: SHIPPED | OUTPATIENT
Start: 2022-01-01 | End: 2022-01-01

## 2022-01-01 RX ORDER — HYDROCORTISONE 10 MG/1
TABLET ORAL
Qty: 42 TABLET | Refills: 0 | Status: CANCELLED | OUTPATIENT
Start: 2022-01-01

## 2022-01-01 RX ORDER — TESTOSTERONE 4 MG/D
1 PATCH TRANSDERMAL DAILY
Qty: 30 PATCH | Refills: 0 | Status: SHIPPED | OUTPATIENT
Start: 2022-01-01 | End: 2022-01-01

## 2022-01-01 RX ORDER — HYDROCORTISONE 10 MG/1
TABLET ORAL
Qty: 90 TABLET | Refills: 1 | Status: SHIPPED | OUTPATIENT
Start: 2022-01-01 | End: 2022-01-01 | Stop reason: SDUPTHER

## 2022-01-01 RX ORDER — ERGOCALCIFEROL 1.25 MG/1
1.25 CAPSULE ORAL
Qty: 12 CAPSULE | Refills: 1 | Status: SHIPPED | OUTPATIENT
Start: 2022-01-01 | End: 2023-01-01

## 2022-01-01 RX ORDER — LEVOTHYROXINE SODIUM 88 UG/1
TABLET ORAL
Qty: 14 TABLET | Refills: 10 | Status: CANCELLED | OUTPATIENT
Start: 2022-01-01

## 2022-01-01 RX ORDER — PRAVASTATIN SODIUM 10 MG
TABLET ORAL
Qty: 30 TABLET | Refills: 1 | Status: SHIPPED | OUTPATIENT
Start: 2022-01-01 | End: 2022-01-01 | Stop reason: SDUPTHER

## 2022-01-01 RX ORDER — LORATADINE 10 MG/1
TABLET ORAL
Qty: 30 TABLET | Refills: 1 | Status: SHIPPED | OUTPATIENT
Start: 2022-01-01 | End: 2022-01-01 | Stop reason: SDUPTHER

## 2022-01-01 RX ORDER — ESLICARBAZEPINE ACETATE 400 MG/1
TABLET ORAL
Qty: 14 TABLET | Refills: 4 | Status: CANCELLED | OUTPATIENT
Start: 2022-01-01

## 2022-01-01 RX ORDER — LEVOTHYROXINE SODIUM 88 UG/1
TABLET ORAL
Qty: 30 TABLET | Refills: 1 | Status: SHIPPED | OUTPATIENT
Start: 2022-01-01 | End: 2022-01-01 | Stop reason: SDUPTHER

## 2022-01-01 RX ORDER — SULFAMETHOXAZOLE AND TRIMETHOPRIM 800; 160 MG/1; MG/1
1 TABLET ORAL 2 TIMES DAILY
Qty: 10 TABLET | Refills: 0 | Status: SHIPPED | OUTPATIENT
Start: 2022-01-01 | End: 2022-01-01

## 2022-01-01 RX ORDER — HYDROCORTISONE 10 MG/1
TABLET ORAL
Qty: 90 TABLET | Refills: 5 | Status: SHIPPED | OUTPATIENT
Start: 2022-01-01 | End: 2023-01-01

## 2022-01-01 RX ORDER — VITAMIN B COMPLEX
25 TABLET ORAL DAILY
COMMUNITY
Start: 2022-01-01 | End: 2022-01-01 | Stop reason: DRUGHIGH

## 2022-01-01 RX ORDER — TESTOSTERONE 4 MG/D
1 PATCH TRANSDERMAL DAILY
Qty: 30 PATCH | Refills: 0 | Status: CANCELLED | OUTPATIENT
Start: 2022-01-01

## 2022-01-01 RX ORDER — DESMOPRESSIN ACETATE 0.2 MG/1
TABLET ORAL
Qty: 180 TABLET | Refills: 1 | Status: SHIPPED | OUTPATIENT
Start: 2022-01-01 | End: 2022-01-01 | Stop reason: SDUPTHER

## 2022-01-01 RX ORDER — LEVOTHYROXINE SODIUM 88 UG/1
TABLET ORAL
Qty: 14 TABLET | Refills: 0 | Status: CANCELLED | OUTPATIENT
Start: 2022-01-01

## 2022-01-01 RX ORDER — DESMOPRESSIN ACETATE 0.2 MG/1
TABLET ORAL
Qty: 180 TABLET | Refills: 5 | Status: SHIPPED | OUTPATIENT
Start: 2022-01-01 | End: 2023-01-01

## 2022-01-01 RX ORDER — TESTOSTERONE 4 MG/D
1 PATCH TRANSDERMAL DAILY
Qty: 30 PATCH | Refills: 0 | Status: SHIPPED | OUTPATIENT
Start: 2022-01-01 | End: 2022-01-01 | Stop reason: SDUPTHER

## 2022-01-01 RX ORDER — ESLICARBAZEPINE ACETATE 400 MG/1
TABLET ORAL
Qty: 14 TABLET | Refills: 2 | Status: SHIPPED | OUTPATIENT
Start: 2022-01-01 | End: 2022-01-01 | Stop reason: SDUPTHER

## 2022-01-01 RX ORDER — TESTOSTERONE CYPIONATE 200 MG/ML
150 INJECTION, SOLUTION INTRAMUSCULAR
Status: COMPLETED | OUTPATIENT
Start: 2022-01-01 | End: 2022-01-01

## 2022-01-01 RX ORDER — CLONAZEPAM 0.5 MG/1
TABLET ORAL
COMMUNITY
Start: 2022-01-01 | End: 2023-01-01 | Stop reason: SDUPTHER

## 2022-01-01 RX ORDER — LEVOTHYROXINE SODIUM 88 UG/1
88 TABLET ORAL EVERY MORNING
Qty: 30 TABLET | Refills: 5 | Status: SHIPPED | OUTPATIENT
Start: 2022-01-01 | End: 2023-01-01

## 2022-01-01 RX ORDER — TESTOSTERONE 2 MG/D
PATCH TRANSDERMAL
Qty: 30 PATCH | Refills: 0 | Status: SHIPPED | OUTPATIENT
Start: 2022-01-01 | End: 2022-01-01 | Stop reason: SDUPTHER

## 2022-01-01 RX ORDER — PRAVASTATIN SODIUM 10 MG
10 TABLET ORAL AT BEDTIME
Qty: 30 TABLET | Refills: 5 | Status: SHIPPED | OUTPATIENT
Start: 2022-01-01 | End: 2023-01-01

## 2022-01-01 RX ORDER — TESTOSTERONE CYPIONATE 200 MG/ML
150 INJECTION, SOLUTION INTRAMUSCULAR
Status: SHIPPED | OUTPATIENT
Start: 2022-01-01 | End: 2023-01-01

## 2022-01-01 RX ORDER — TESTOSTERONE 2 MG/D
PATCH TRANSDERMAL
Qty: 30 PATCH | Refills: 0 | Status: CANCELLED | OUTPATIENT
Start: 2022-01-01

## 2022-01-01 RX ORDER — LORATADINE 10 MG/1
10 TABLET ORAL DAILY
Qty: 30 TABLET | Refills: 5 | Status: SHIPPED | OUTPATIENT
Start: 2022-01-01 | End: 2023-01-01

## 2022-01-01 RX ORDER — PREDNISONE 20 MG/1
40 TABLET ORAL DAILY
Qty: 10 TABLET | Refills: 0 | Status: SHIPPED | OUTPATIENT
Start: 2022-01-01 | End: 2022-01-01

## 2022-01-01 RX ORDER — CLINDAMYCIN HYDROCHLORIDE 300 MG/1
300 CAPSULE ORAL 3 TIMES DAILY
Qty: 30 CAPSULE | Refills: 0 | Status: SHIPPED | OUTPATIENT
Start: 2022-01-01 | End: 2022-01-01

## 2022-01-01 RX ADMIN — TESTOSTERONE CYPIONATE 150 MG: 200 INJECTION, SOLUTION INTRAMUSCULAR at 10:33

## 2022-01-01 RX ADMIN — TESTOSTERONE CYPIONATE 150 MG: 200 INJECTION, SOLUTION INTRAMUSCULAR at 11:00

## 2022-01-01 RX ADMIN — TESTOSTERONE CYPIONATE 150 MG: 200 INJECTION, SOLUTION INTRAMUSCULAR at 10:58

## 2022-01-01 RX ADMIN — TESTOSTERONE CYPIONATE 150 MG: 200 INJECTION, SOLUTION INTRAMUSCULAR at 11:15

## 2022-01-01 RX ADMIN — TESTOSTERONE CYPIONATE 150 MG: 200 INJECTION, SOLUTION INTRAMUSCULAR at 11:53

## 2022-01-01 RX ADMIN — TESTOSTERONE CYPIONATE 150 MG: 200 INJECTION, SOLUTION INTRAMUSCULAR at 10:45

## 2022-01-01 RX ADMIN — TESTOSTERONE CYPIONATE 150 MG: 200 INJECTION, SOLUTION INTRAMUSCULAR at 10:10

## 2022-01-01 ASSESSMENT — ENCOUNTER SYMPTOMS
NERVOUS/ANXIOUS: 0
WEAKNESS: 0
PHOTOPHOBIA: 0
FATIGUE: 0
LIGHT-HEADEDNESS: 1
DIAPHORESIS: 0
FEVER: 0
SINUS PRESSURE: 0
BACK PAIN: 0
EYE DISCHARGE: 0
TROUBLE SWALLOWING: 0
HEADACHES: 0
GASTROINTESTINAL NEGATIVE: 1
RESPIRATORY NEGATIVE: 1
SORE THROAT: 0
ACTIVITY CHANGE: 0
EYE REDNESS: 0
DIZZINESS: 0
CHILLS: 0

## 2022-01-01 ASSESSMENT — PAIN SCALES - GENERAL: PAINLEVEL: 0

## 2022-01-07 RX ORDER — HYDROCORTISONE 10 MG/1
TABLET ORAL
Qty: 90 TABLET | Refills: 3 | Status: SHIPPED | OUTPATIENT
Start: 2022-01-07 | End: 2022-03-03

## 2022-01-10 ENCOUNTER — APPOINTMENT (OUTPATIENT)
Dept: INTERNAL MEDICINE | Age: 62
End: 2022-01-10

## 2022-01-14 RX ORDER — ESLICARBAZEPINE ACETATE 400 MG/1
TABLET ORAL
Qty: 14 TABLET | Refills: 4 | Status: SHIPPED | OUTPATIENT
Start: 2022-01-14 | End: 2022-03-22

## 2022-01-27 ENCOUNTER — OFFICE VISIT (OUTPATIENT)
Dept: INTERNAL MEDICINE | Age: 62
End: 2022-01-27

## 2022-01-27 VITALS
OXYGEN SATURATION: 95 % | SYSTOLIC BLOOD PRESSURE: 130 MMHG | WEIGHT: 250 LBS | HEART RATE: 86 BPM | TEMPERATURE: 97.3 F | BODY MASS INDEX: 37.89 KG/M2 | HEIGHT: 68 IN | DIASTOLIC BLOOD PRESSURE: 82 MMHG

## 2022-01-27 DIAGNOSIS — H10.022 OTHER MUCOPURULENT CONJUNCTIVITIS OF LEFT EYE: Primary | ICD-10-CM

## 2022-01-27 DIAGNOSIS — E66.09 CLASS 2 OBESITY DUE TO EXCESS CALORIES WITH BODY MASS INDEX (BMI) OF 38.0 TO 38.9 IN ADULT, UNSPECIFIED WHETHER SERIOUS COMORBIDITY PRESENT: ICD-10-CM

## 2022-01-27 DIAGNOSIS — E03.4 HYPOTHYROIDISM DUE TO ACQUIRED ATROPHY OF THYROID: ICD-10-CM

## 2022-01-27 DIAGNOSIS — R26.9 ABNORMALITY OF GAIT: ICD-10-CM

## 2022-01-27 DIAGNOSIS — E78.2 MIXED HYPERLIPIDEMIA: ICD-10-CM

## 2022-01-27 DIAGNOSIS — R79.89 LOW TESTOSTERONE LEVEL IN MALE: ICD-10-CM

## 2022-01-27 DIAGNOSIS — R79.89 LOW VITAMIN D LEVEL: ICD-10-CM

## 2022-01-27 DIAGNOSIS — E23.0 PANHYPOPITUITARISM (CMD): ICD-10-CM

## 2022-01-27 PROCEDURE — 99214 OFFICE O/P EST MOD 30 MIN: CPT | Performed by: INTERNAL MEDICINE

## 2022-01-27 RX ORDER — TOBRAMYCIN 3 MG/ML
1 SOLUTION/ DROPS OPHTHALMIC EVERY 4 HOURS
Qty: 5 ML | Refills: 0 | Status: SHIPPED | OUTPATIENT
Start: 2022-01-27 | End: 2023-05-10

## 2022-01-27 ASSESSMENT — ENCOUNTER SYMPTOMS
RESPIRATORY NEGATIVE: 1
DIAPHORESIS: 0
WEAKNESS: 0
GASTROINTESTINAL NEGATIVE: 1
TROUBLE SWALLOWING: 0
FATIGUE: 0
SORE THROAT: 0
RHINORRHEA: 0
DIZZINESS: 0
CHILLS: 0
LIGHT-HEADEDNESS: 0
HEADACHES: 0
EYE REDNESS: 0
SINUS PRESSURE: 0
FEVER: 0
PHOTOPHOBIA: 0
NERVOUS/ANXIOUS: 0

## 2022-01-27 ASSESSMENT — PAIN SCALES - GENERAL: PAINLEVEL: 0

## 2022-01-27 ASSESSMENT — PATIENT HEALTH QUESTIONNAIRE - PHQ9
SUM OF ALL RESPONSES TO PHQ9 QUESTIONS 1 AND 2: 0
1. LITTLE INTEREST OR PLEASURE IN DOING THINGS: NOT AT ALL
SUM OF ALL RESPONSES TO PHQ9 QUESTIONS 1 AND 2: 0
2. FEELING DOWN, DEPRESSED OR HOPELESS: NOT AT ALL
CLINICAL INTERPRETATION OF PHQ2 SCORE: NO FURTHER SCREENING NEEDED

## 2022-01-28 ENCOUNTER — LAB SERVICES (OUTPATIENT)
Dept: LAB | Age: 62
End: 2022-01-28

## 2022-01-28 DIAGNOSIS — E23.0 PANHYPOPITUITARISM (CMD): ICD-10-CM

## 2022-01-28 DIAGNOSIS — E78.2 MIXED HYPERLIPIDEMIA: ICD-10-CM

## 2022-01-28 DIAGNOSIS — E03.4 HYPOTHYROIDISM DUE TO ACQUIRED ATROPHY OF THYROID: ICD-10-CM

## 2022-01-28 LAB
ALBUMIN SERPL BCG-MCNC: 4 G/DL (ref 3.6–5.1)
ALP SERPL-CCNC: 87 U/L (ref 45–115)
ALT SERPL W/O P-5'-P-CCNC: 21 U/L (ref 10–35)
AST SERPL-CCNC: 18 U/L (ref 9–37)
BILIRUB DIRECT SERPL-MCNC: 0.1 MG/DL (ref 0–0.2)
BILIRUB SERPL-MCNC: 0.7 MG/DL (ref 0–1)
CHOLEST SERPL-MCNC: 166 MG/DL (ref 140–200)
HDLC SERPL-MCNC: 36 MG/DL
LDLC SERPL CALC-MCNC: 102 MG/DL (ref 0–100)
PROT SERPL-MCNC: 7.2 G/DL (ref 6.2–8.1)
TRIGL SERPL-MCNC: 137 MG/DL (ref 0–200)
TSH SERPL DL<=0.05 MIU/L-ACNC: 1.23 M[IU]/L (ref 0.3–4.82)

## 2022-01-28 PROCEDURE — 36415 COLL VENOUS BLD VENIPUNCTURE: CPT | Performed by: INTERNAL MEDICINE

## 2022-01-28 PROCEDURE — 84443 ASSAY THYROID STIM HORMONE: CPT | Performed by: INTERNAL MEDICINE

## 2022-01-28 PROCEDURE — 80076 HEPATIC FUNCTION PANEL: CPT | Performed by: INTERNAL MEDICINE

## 2022-01-28 PROCEDURE — 80061 LIPID PANEL: CPT | Performed by: INTERNAL MEDICINE

## 2022-01-28 ASSESSMENT — ENCOUNTER SYMPTOMS
ACTIVITY CHANGE: 1
UNEXPECTED WEIGHT CHANGE: 1
EYE DISCHARGE: 1
HALLUCINATIONS: 0
EYE ITCHING: 0

## 2022-02-04 DIAGNOSIS — H57.9 ITCHY EYES: ICD-10-CM

## 2022-02-04 RX ORDER — LORATADINE 10 MG/1
TABLET ORAL
Qty: 30 TABLET | Refills: 3 | Status: SHIPPED | OUTPATIENT
Start: 2022-02-04 | End: 2022-01-01

## 2022-02-04 RX ORDER — DESMOPRESSIN ACETATE 0.2 MG/1
TABLET ORAL
Qty: 180 TABLET | Refills: 3 | Status: SHIPPED | OUTPATIENT
Start: 2022-02-04 | End: 2022-01-01

## 2022-03-03 RX ORDER — PRAVASTATIN SODIUM 10 MG
TABLET ORAL
Qty: 30 TABLET | Refills: 3 | Status: SHIPPED | OUTPATIENT
Start: 2022-03-03 | End: 2022-01-01

## 2022-03-03 RX ORDER — HYDROCORTISONE 10 MG/1
TABLET ORAL
Qty: 90 TABLET | Refills: 3 | Status: SHIPPED | OUTPATIENT
Start: 2022-03-03 | End: 2022-01-01

## 2022-03-03 RX ORDER — LEVOTHYROXINE SODIUM 88 UG/1
TABLET ORAL
Qty: 30 TABLET | Refills: 3 | Status: SHIPPED | OUTPATIENT
Start: 2022-03-03 | End: 2022-01-01

## 2022-03-04 RX ORDER — TESTOSTERONE 2 MG/D
PATCH TRANSDERMAL
Qty: 30 PATCH | Refills: 0 | Status: SHIPPED | OUTPATIENT
Start: 2022-03-04 | End: 2022-04-01

## 2022-03-22 RX ORDER — ESLICARBAZEPINE ACETATE 400 MG/1
TABLET ORAL
Qty: 30 TABLET | Refills: 3 | Status: SHIPPED | OUTPATIENT
Start: 2022-03-22 | End: 2022-01-01

## 2022-03-31 DIAGNOSIS — R79.89 LOW TESTOSTERONE LEVEL IN MALE: ICD-10-CM

## 2022-04-01 RX ORDER — TESTOSTERONE 2 MG/D
PATCH TRANSDERMAL
Qty: 30 PATCH | Refills: 0 | Status: SHIPPED | OUTPATIENT
Start: 2022-04-01 | End: 2022-04-29

## 2022-04-01 RX ORDER — TESTOSTERONE 4 MG/D
1 PATCH TRANSDERMAL DAILY
Qty: 30 PATCH | Refills: 0 | Status: SHIPPED | OUTPATIENT
Start: 2022-04-01 | End: 2022-04-29

## 2022-04-27 DIAGNOSIS — R79.89 LOW TESTOSTERONE LEVEL IN MALE: ICD-10-CM

## 2022-04-29 RX ORDER — TESTOSTERONE 2 MG/D
PATCH TRANSDERMAL
Qty: 30 PATCH | Refills: 0 | Status: SHIPPED | OUTPATIENT
Start: 2022-04-29 | End: 2022-01-01

## 2022-04-29 RX ORDER — TESTOSTERONE 4 MG/D
1 PATCH TRANSDERMAL DAILY
Qty: 30 PATCH | Refills: 0 | Status: SHIPPED | OUTPATIENT
Start: 2022-04-29 | End: 2022-01-01

## 2022-07-19 PROBLEM — Z87.898 HISTORY OF SEIZURES: Status: ACTIVE | Noted: 2018-10-11

## 2023-01-01 ENCOUNTER — NURSE ONLY (OUTPATIENT)
Dept: INTERNAL MEDICINE | Age: 63
End: 2023-01-01

## 2023-01-01 ENCOUNTER — ADVANCED DIRECTIVES (OUTPATIENT)
Dept: HEALTH INFORMATION MANAGEMENT | Age: 63
End: 2023-01-01

## 2023-01-01 ENCOUNTER — LAB SERVICES (OUTPATIENT)
Dept: LAB | Age: 63
End: 2023-01-01

## 2023-01-01 ENCOUNTER — EXTERNAL RECORD (OUTPATIENT)
Dept: HEALTH INFORMATION MANAGEMENT | Facility: OTHER | Age: 63
End: 2023-01-01

## 2023-01-01 ENCOUNTER — OFFICE VISIT (OUTPATIENT)
Dept: INTERNAL MEDICINE | Age: 63
End: 2023-01-01

## 2023-01-01 ENCOUNTER — TELEPHONE (OUTPATIENT)
Dept: INTERNAL MEDICINE | Age: 63
End: 2023-01-01

## 2023-01-01 ENCOUNTER — EXTERNAL RECORD (OUTPATIENT)
Dept: OTHER | Facility: PHYSICIAN GROUP | Age: 63
End: 2023-01-01

## 2023-01-01 ENCOUNTER — EXTERNAL RECORD (OUTPATIENT)
Dept: OTHER | Age: 63
End: 2023-01-01

## 2023-01-01 ENCOUNTER — EXTERNAL RECORD (OUTPATIENT)
Dept: PULMONOLOGY | Age: 63
End: 2023-01-01

## 2023-01-01 ENCOUNTER — APPOINTMENT (OUTPATIENT)
Dept: INTERNAL MEDICINE | Age: 63
End: 2023-01-01

## 2023-01-01 ENCOUNTER — EXTERNAL RECORD (OUTPATIENT)
Dept: NEPHROLOGY | Age: 63
End: 2023-01-01

## 2023-01-01 ENCOUNTER — EXTERNAL RECORD (OUTPATIENT)
Dept: INFECTIOUS DISEASES | Age: 63
End: 2023-01-01

## 2023-01-01 VITALS
WEIGHT: 246.7 LBS | BODY MASS INDEX: 38.07 KG/M2 | OXYGEN SATURATION: 97 % | DIASTOLIC BLOOD PRESSURE: 88 MMHG | TEMPERATURE: 96.6 F | HEART RATE: 84 BPM | SYSTOLIC BLOOD PRESSURE: 138 MMHG

## 2023-01-01 DIAGNOSIS — E23.0 PANHYPOPITUITARISM (CMD): ICD-10-CM

## 2023-01-01 DIAGNOSIS — E78.2 MIXED HYPERLIPIDEMIA: ICD-10-CM

## 2023-01-01 DIAGNOSIS — E03.4 HYPOTHYROIDISM DUE TO ACQUIRED ATROPHY OF THYROID: ICD-10-CM

## 2023-01-01 DIAGNOSIS — Z00.00 ROUTINE GENERAL MEDICAL EXAMINATION AT A HEALTH CARE FACILITY: Primary | ICD-10-CM

## 2023-01-01 DIAGNOSIS — R79.89 LOW TESTOSTERONE LEVEL IN MALE: Primary | ICD-10-CM

## 2023-01-01 DIAGNOSIS — L71.9 ROSACEA: ICD-10-CM

## 2023-01-01 DIAGNOSIS — Z12.11 SCREEN FOR COLON CANCER: ICD-10-CM

## 2023-01-01 DIAGNOSIS — R79.89 LOW TESTOSTERONE: Primary | ICD-10-CM

## 2023-01-01 DIAGNOSIS — Z12.5 SCREENING FOR PROSTATE CANCER: ICD-10-CM

## 2023-01-01 DIAGNOSIS — R79.89 LOW TESTOSTERONE LEVEL IN MALE: ICD-10-CM

## 2023-01-01 DIAGNOSIS — E66.01 SEVERE OBESITY (BMI 35.0-39.9) WITH COMORBIDITY (CMD): ICD-10-CM

## 2023-01-01 DIAGNOSIS — R79.89 LOW VITAMIN D LEVEL: ICD-10-CM

## 2023-01-01 DIAGNOSIS — Z00.00 ROUTINE GENERAL MEDICAL EXAMINATION AT A HEALTH CARE FACILITY: ICD-10-CM

## 2023-01-01 DIAGNOSIS — Z87.898 HISTORY OF SEIZURES: ICD-10-CM

## 2023-01-01 DIAGNOSIS — S81.801A WOUND OF RIGHT LOWER EXTREMITY, INITIAL ENCOUNTER: ICD-10-CM

## 2023-01-01 DIAGNOSIS — R79.89 LOW SERUM TESTOSTERONE LEVEL: ICD-10-CM

## 2023-01-01 LAB
*CSF, APPEARANCE: NORMAL
*CSF, COLOR: NORMAL
*CSF, NUCLEATED CELLS: 9 /CU. MM.
*CSF, RBC: 7250 /CU. MM.
*CSF, TUBE#: 3
*EOSINOPHILS: 5 % (ref 0–8)
*MEAN CORPUSCULAR HGB CONC: 33 G/DL (ref 32.5–35.8)
*MEAN CORPUSCULAR HGB CONC: 33.4 G/DL (ref 32.5–35.8)
*MEAN CORPUSCULAR HGB CONC: 33.6 G/DL (ref 32.5–35.8)
*MEAN CORPUSCULAR HGB CONC: 33.7 G/DL (ref 32.5–35.8)
*MEAN CORPUSCULAR HGB CONC: 33.7 G/DL (ref 32.5–35.8)
*MEAN CORPUSCULAR HGB CONC: 33.8 G/DL (ref 32.5–35.8)
*MEAN CORPUSCULAR HGB CONC: 33.8 G/DL (ref 32.5–35.8)
*MEAN CORPUSCULAR HGB CONC: 33.9 G/DL (ref 32.5–35.8)
*MEAN CORPUSCULAR HGB CONC: 34 G/DL (ref 32.5–35.8)
*MEAN CORPUSCULAR HGB CONC: 34 G/DL (ref 32.5–35.8)
*MEAN CORPUSCULAR HGB CONC: 34.1 G/DL (ref 32.5–35.8)
*MEAN CORPUSCULAR HGB CONC: 34.2 G/DL (ref 32.5–35.8)
*MEAN CORPUSCULAR HGB CONC: 34.8 G/DL (ref 32.5–35.8)
25(OH)D3+25(OH)D2 SERPL-MCNC: 35.6 NG/ML (ref 30–100)
A/G RATIO: 1.12 (ref 1.1–2.4)
A/G RATIO: 1.13 (ref 1.1–2.4)
A/G RATIO: 1.31 (ref 1.1–2.4)
A/G RATIO: 1.32 (ref 1.1–2.4)
ADENOVIRUS F 40/41: NOT DETECTED
ADENOVIRUS: NOT DETECTED
ALANINE AMINOTRANSFE: 25 U/L (ref 7–52)
ALANINE AMINOTRANSFE: 31 U/L (ref 7–52)
ALANINE AMINOTRANSFE: 32 U/L (ref 7–52)
ALANINE AMINOTRANSFE: 45 U/L (ref 7–52)
ALBUMIN SERPL-MCNC: 3.7 G/DL (ref 3.6–5.1)
ALBUMIN, SERUM (ALB): 2.6 G/DL (ref 3.5–5.7)
ALBUMIN, SERUM (ALB): 2.7 G/DL (ref 3.5–5.7)
ALBUMIN, SERUM (ALB): 2.7 G/DL (ref 3.5–5.7)
ALBUMIN, SERUM (ALB): 2.8 G/DL (ref 3.5–5.7)
ALBUMIN, SERUM (ALB): 2.9 G/DL (ref 3.5–5.7)
ALBUMIN, SERUM (ALB): 2.9 G/DL (ref 3.5–5.7)
ALBUMIN, SERUM (ALB): 3 G/DL (ref 3.5–5.7)
ALBUMIN, SERUM (ALB): 3.1 G/DL (ref 3.5–5.7)
ALBUMIN, SERUM (ALB): 3.2 G/DL (ref 3.5–5.7)
ALBUMIN, SERUM (ALB): 3.3 G/DL (ref 3.5–5.7)
ALBUMIN, SERUM (ALB): 3.4 G/DL (ref 3.5–5.7)
ALBUMIN, SERUM (ALB): 3.7 G/DL (ref 3.5–5.7)
ALBUMIN, SERUM (ALB): 4.7 G/DL (ref 3.5–5.7)
ALBUMIN/GLOB SERPL: 1.2 {RATIO} (ref 1–2.4)
ALKALINE PHOSPHATASE (ALK): 26 U/L (ref 34–104)
ALKALINE PHOSPHATASE (ALK): 30 U/L (ref 34–104)
ALKALINE PHOSPHATASE (ALK): 36 U/L (ref 34–104)
ALKALINE PHOSPHATASE (ALK): 67 U/L (ref 34–104)
ALLEN'S TEST PERFORMED: POSITIVE
ALP SERPL-CCNC: 58 UNITS/L (ref 45–117)
ALT SERPL-CCNC: 36 UNITS/L
AMMONIA: 54 UMOL/L (ref 16–52)
AMMONIA: 63 UMOL/L (ref 16–52)
AMORPHOUS: ABNORMAL /HPF
ANION GAP SERPL CALC-SCNC: 13 MMOL/L (ref 7–19)
ANION GAP: 10 MEQ/L (ref 6.2–14.7)
ANION GAP: 11 MEQ/L (ref 6.2–14.7)
ANION GAP: 11 MEQ/L (ref 6.2–14.7)
ANION GAP: 13 MEQ/L (ref 6.2–14.7)
ANION GAP: 27 MEQ/L (ref 6.2–14.7)
ANION GAP: 6 MEQ/L (ref 6.2–14.7)
ANION GAP: 7 MEQ/L (ref 6.2–14.7)
ANION GAP: 7 MEQ/L (ref 6.2–14.7)
ANION GAP: 8 MEQ/L (ref 6.2–14.7)
ANION GAP: 9 MEQ/L (ref 6.2–14.7)
APPEARANCE: ABNORMAL
APPEARANCE: CLEAR
ASPARTATE AMINOTRANS: 116 U/L (ref 13–39)
ASPARTATE AMINOTRANS: 130 U/L (ref 13–39)
ASPARTATE AMINOTRANS: 21 U/L (ref 13–39)
ASPARTATE AMINOTRANS: 274 U/L (ref 13–39)
AST SERPL-CCNC: 21 UNITS/L
ASTROVIRUS: NOT DETECTED
BACTERIA: ABNORMAL /HPF
BACTERIA: ABNORMAL /HPF
BAND MANUAL: 17 % (ref 0–4)
BASE EXCESS: -3.3 MMOL/L (ref -2–3)
BASE EXCESS: -4.2 MMOL/L (ref -2–3)
BASE EXCESS: -6.6 MMOL/L (ref -2–3)
BASE EXCESS: -8 MMOL/L (ref -2–3)
BASE EXCESS: 1.7 MMOL/L (ref -2–3)
BASOPHIL AUTOMATED: 0.1 %
BASOPHIL AUTOMATED: 0.2 %
BASOPHIL AUTOMATED: 0.2 %
BASOPHIL AUTOMATED: 0.4 %
BASOPHIL AUTOMATED: 0.5 %
BASOPHIL AUTOMATED: 0.7 %
BASOPHIL AUTOMATED: 0.8 %
BASOPHIL AUTOMATED: 0.9 %
BASOPHIL AUTOMATED: 1.1 %
BASOPHIL MANUAL: 1 % (ref 0–2)
BASOPHILS # BLD: 0.1 K/MCL (ref 0–0.3)
BASOPHILS NFR BLD: 1 %
BASOPHILS: 0 (ref 0–0.14)
BASOPHILS: 0.1 (ref 0–0.14)
BASOPHILS: 0.2 (ref 0–0.14)
BASOPHILS: 0.2 (ref 0–0.14)
BEDSIDE GLUCOSE: 101 MG/DL (ref 70–110)
BEDSIDE GLUCOSE: 103 MG/DL (ref 70–110)
BEDSIDE GLUCOSE: 104 MG/DL (ref 70–110)
BEDSIDE GLUCOSE: 106 MG/DL (ref 70–110)
BEDSIDE GLUCOSE: 110 MG/DL (ref 70–110)
BEDSIDE GLUCOSE: 112 MG/DL (ref 70–110)
BEDSIDE GLUCOSE: 113 MG/DL (ref 70–110)
BEDSIDE GLUCOSE: 114 MG/DL (ref 70–110)
BEDSIDE GLUCOSE: 115 MG/DL (ref 70–110)
BEDSIDE GLUCOSE: 118 MG/DL (ref 70–110)
BEDSIDE GLUCOSE: 119 MG/DL (ref 70–110)
BEDSIDE GLUCOSE: 121 MG/DL (ref 70–110)
BEDSIDE GLUCOSE: 124 MG/DL (ref 70–110)
BEDSIDE GLUCOSE: 125 MG/DL (ref 70–110)
BEDSIDE GLUCOSE: 128 MG/DL (ref 70–110)
BEDSIDE GLUCOSE: 128 MG/DL (ref 70–110)
BEDSIDE GLUCOSE: 129 MG/DL (ref 70–110)
BEDSIDE GLUCOSE: 129 MG/DL (ref 70–110)
BEDSIDE GLUCOSE: 137 MG/DL (ref 70–110)
BEDSIDE GLUCOSE: 137 MG/DL (ref 70–110)
BEDSIDE GLUCOSE: 140 MG/DL (ref 70–110)
BEDSIDE GLUCOSE: 140 MG/DL (ref 70–110)
BEDSIDE GLUCOSE: 141 MG/DL (ref 70–110)
BEDSIDE GLUCOSE: 142 MG/DL (ref 70–110)
BEDSIDE GLUCOSE: 143 MG/DL (ref 70–110)
BEDSIDE GLUCOSE: 143 MG/DL (ref 70–110)
BEDSIDE GLUCOSE: 145 MG/DL (ref 70–110)
BEDSIDE GLUCOSE: 146 MG/DL (ref 70–110)
BEDSIDE GLUCOSE: 152 MG/DL (ref 70–110)
BEDSIDE GLUCOSE: 153 MG/DL (ref 70–110)
BEDSIDE GLUCOSE: 154 MG/DL (ref 70–110)
BEDSIDE GLUCOSE: 157 MG/DL (ref 70–110)
BEDSIDE GLUCOSE: 159 MG/DL (ref 70–110)
BEDSIDE GLUCOSE: 163 MG/DL (ref 70–110)
BEDSIDE GLUCOSE: 163 MG/DL (ref 70–110)
BEDSIDE GLUCOSE: 164 MG/DL (ref 70–110)
BEDSIDE GLUCOSE: 170 MG/DL (ref 70–110)
BEDSIDE GLUCOSE: 188 MG/DL (ref 70–110)
BEDSIDE GLUCOSE: 208 MG/DL (ref 70–110)
BEDSIDE GLUCOSE: 57 MG/DL (ref 70–110)
BEDSIDE GLUCOSE: 74 MG/DL (ref 70–110)
BEDSIDE GLUCOSE: 79 MG/DL (ref 70–110)
BEDSIDE GLUCOSE: 82 MG/DL (ref 70–110)
BEDSIDE GLUCOSE: 83 MG/DL (ref 70–110)
BEDSIDE GLUCOSE: 84 MG/DL (ref 70–110)
BEDSIDE GLUCOSE: 85 MG/DL (ref 70–110)
BEDSIDE GLUCOSE: 86 MG/DL (ref 70–110)
BEDSIDE GLUCOSE: 87 MG/DL (ref 70–110)
BEDSIDE GLUCOSE: 87 MG/DL (ref 70–110)
BEDSIDE GLUCOSE: 91 MG/DL (ref 70–110)
BEDSIDE GLUCOSE: 93 MG/DL (ref 70–110)
BEDSIDE GLUCOSE: 94 MG/DL (ref 70–110)
BEDSIDE GLUCOSE: 95 MG/DL (ref 70–110)
BEDSIDE GLUCOSE: 96 MG/DL (ref 70–110)
BEDSIDE GLUCOSE: 97 MG/DL (ref 70–110)
BEDSIDE GLUCOSE: 98 MG/DL (ref 70–110)
BILIRUB SERPL-MCNC: 0.3 MG/DL (ref 0.2–1)
BILIRUBIN, TOTAL: 0.4 MG/DL (ref 0.2–0.8)
BILIRUBIN, TOTAL: 0.5 MG/DL (ref 0.2–0.8)
BILIRUBIN, TOTAL: 0.5 MG/DL (ref 0.2–0.8)
BILIRUBIN, TOTAL: 0.7 MG/DL (ref 0.2–0.8)
BILIRUBIN: ABNORMAL
BILIRUBIN: ABNORMAL
BLOOD CULTURE: NORMAL
BLOOD UREA NITROGEN (BUN): 16 MG/DL (ref 7–25)
BLOOD UREA NITROGEN (BUN): 19 MG/DL (ref 7–25)
BLOOD UREA NITROGEN (BUN): 19 MG/DL (ref 7–25)
BLOOD UREA NITROGEN (BUN): 23 MG/DL (ref 7–25)
BLOOD UREA NITROGEN (BUN): 25 MG/DL (ref 7–25)
BLOOD UREA NITROGEN (BUN): 29 MG/DL (ref 7–25)
BLOOD UREA NITROGEN (BUN): 30 MG/DL (ref 7–25)
BLOOD UREA NITROGEN (BUN): 31 MG/DL (ref 7–25)
BLOOD UREA NITROGEN (BUN): 32 MG/DL (ref 7–25)
BLOOD UREA NITROGEN (BUN): 33 MG/DL (ref 7–25)
BLOOD UREA NITROGEN (BUN): 35 MG/DL (ref 7–25)
BLOOD UREA NITROGEN (BUN): 36 MG/DL (ref 7–25)
BLOOD UREA NITROGEN (BUN): 36 MG/DL (ref 7–25)
BLOOD UREA NITROGEN (BUN): 37 MG/DL (ref 7–25)
BLOOD UREA NITROGEN (BUN): 38 MG/DL (ref 7–25)
BLOOD UREA NITROGEN (BUN): 40 MG/DL (ref 7–25)
BLOOD UREA NITROGEN (BUN): 40 MG/DL (ref 7–25)
BLOOD UREA NITROGEN (BUN): 41 MG/DL (ref 7–25)
BLOOD UREA NITROGEN (BUN): 42 MG/DL (ref 7–25)
BLOOD UREA NITROGEN (BUN): 42 MG/DL (ref 7–25)
BLOOD UREA NITROGEN (BUN): 45 MG/DL (ref 7–25)
BORDETELLA PARAPERTUSSIS: NOT DETECTED
BORDETELLA PERTUSSIS: NOT DETECTED
BRAIN NATIURETIC PEPTIDE CHF: 60 PG/ML (ref 0–100)
BUN SERPL-MCNC: 20 MG/DL (ref 6–20)
BUN/CREAT SERPL: 19 (ref 7–25)
BUN/CREATININE RATIO: 10.1 (ref 7.4–23)
BUN/CREATININE RATIO: 10.4 (ref 7.4–23)
BUN/CREATININE RATIO: 10.9 (ref 7.4–23)
BUN/CREATININE RATIO: 11.4 (ref 7.4–23)
BUN/CREATININE RATIO: 13.7 (ref 7.4–23)
BUN/CREATININE RATIO: 14.5 (ref 7.4–23)
BUN/CREATININE RATIO: 17.6 (ref 7.4–23)
BUN/CREATININE RATIO: 17.6 (ref 7.4–23)
BUN/CREATININE RATIO: 18.5 (ref 7.4–23)
BUN/CREATININE RATIO: 18.6 (ref 7.4–23)
BUN/CREATININE RATIO: 18.8 (ref 7.4–23)
BUN/CREATININE RATIO: 18.9 (ref 7.4–23)
BUN/CREATININE RATIO: 19.3 (ref 7.4–23)
BUN/CREATININE RATIO: 19.6 (ref 7.4–23)
BUN/CREATININE RATIO: 19.6 (ref 7.4–23)
BUN/CREATININE RATIO: 19.9 (ref 7.4–23)
BUN/CREATININE RATIO: 20 (ref 7.4–23)
BUN/CREATININE RATIO: 20.9 (ref 7.4–23)
BUN/CREATININE RATIO: 21.6 (ref 7.4–23)
BUN/CREATININE RATIO: 6.1 (ref 7.4–23)
BUN/CREATININE RATIO: 6.6 (ref 7.4–23)
BUN/CREATININE RATIO: 7.1 (ref 7.4–23)
BUN/CREATININE RATIO: 8.2 (ref 7.4–23)
BUN/CREATININE RATIO: 8.2 (ref 7.4–23)
BUN/CREATININE RATIO: 8.4 (ref 7.4–23)
C DIF TOXIN A/B: NOT DETECTED
C-REACTIVE PROTEIN, TITR: 2.4 MG/DL (ref 0–0.9)
CALCIUM ALBUMIN ADJUSTED: 7 MG/DL (ref 8.6–10.3)
CALCIUM ALBUMIN ADJUSTED: 7.3 MG/DL (ref 8.6–10.3)
CALCIUM ALBUMIN ADJUSTED: 8 MG/DL (ref 8.6–10.3)
CALCIUM ALBUMIN ADJUSTED: 8 MG/DL (ref 8.6–10.3)
CALCIUM ALBUMIN ADJUSTED: 8.2 MG/DL (ref 8.6–10.3)
CALCIUM ALBUMIN ADJUSTED: 8.2 MG/DL (ref 8.6–10.3)
CALCIUM ALBUMIN ADJUSTED: 8.3 MG/DL (ref 8.6–10.3)
CALCIUM ALBUMIN ADJUSTED: 8.5 MG/DL (ref 8.6–10.3)
CALCIUM ALBUMIN ADJUSTED: 8.7 MG/DL (ref 8.6–10.3)
CALCIUM ALBUMIN ADJUSTED: 8.8 MG/DL (ref 8.6–10.3)
CALCIUM ALBUMIN ADJUSTED: 8.9 MG/DL (ref 8.6–10.3)
CALCIUM ALBUMIN ADJUSTED: 9 MG/DL (ref 8.6–10.3)
CALCIUM ALBUMIN ADJUSTED: 9.1 MG/DL (ref 8.6–10.3)
CALCIUM OXALATE CRYSTALS: ABNORMAL /HPF
CALCIUM SERPL-MCNC: 8.3 MG/DL (ref 8.4–10.2)
CALCIUM, SERUM: 6 MG/DL (ref 8.6–10.3)
CALCIUM, SERUM: 6.1 MG/DL (ref 8.6–10.3)
CALCIUM, SERUM: 6.4 MG/DL (ref 8.6–10.3)
CALCIUM, SERUM: 6.7 MG/DL (ref 8.6–10.3)
CALCIUM, SERUM: 7.2 MG/DL (ref 8.6–10.3)
CALCIUM, SERUM: 7.5 MG/DL (ref 8.6–10.3)
CALCIUM, SERUM: 7.5 MG/DL (ref 8.6–10.3)
CALCIUM, SERUM: 7.6 MG/DL (ref 8.6–10.3)
CALCIUM, SERUM: 7.7 MG/DL (ref 8.6–10.3)
CALCIUM, SERUM: 7.7 MG/DL (ref 8.6–10.3)
CALCIUM, SERUM: 7.8 MG/DL (ref 8.6–10.3)
CALCIUM, SERUM: 8 MG/DL (ref 8.6–10.3)
CALCIUM, SERUM: 8 MG/DL (ref 8.6–10.3)
CALCIUM, SERUM: 8.1 MG/DL (ref 8.6–10.3)
CALCIUM, SERUM: 8.2 MG/DL (ref 8.6–10.3)
CALCIUM, SERUM: 8.2 MG/DL (ref 8.6–10.3)
CALCIUM, SERUM: 8.3 MG/DL (ref 8.6–10.3)
CALCIUM, SERUM: 8.4 MG/DL (ref 8.6–10.3)
CALCIUM, SERUM: 8.4 MG/DL (ref 8.6–10.3)
CALCIUM, SERUM: 8.6 MG/DL (ref 8.6–10.3)
CALCIUM, SERUM: 9.3 MG/DL (ref 8.6–10.3)
CAMPYLOBACTER: NOT DETECTED
CARBON DIOXIDE: 14 MEQ/L (ref 21–31)
CARBON DIOXIDE: 16 MEQ/L (ref 21–31)
CARBON DIOXIDE: 18 MEQ/L (ref 21–31)
CARBON DIOXIDE: 18 MEQ/L (ref 21–31)
CARBON DIOXIDE: 19 MEQ/L (ref 21–31)
CARBON DIOXIDE: 21 MEQ/L (ref 21–31)
CARBON DIOXIDE: 22 MEQ/L (ref 21–31)
CARBON DIOXIDE: 22 MEQ/L (ref 21–31)
CARBON DIOXIDE: 23 MEQ/L (ref 21–31)
CARBON DIOXIDE: 24 MEQ/L (ref 21–31)
CARBON DIOXIDE: 25 MEQ/L (ref 21–31)
CARBON DIOXIDE: 26 MEQ/L (ref 21–31)
CARBON DIOXIDE: 27 MEQ/L (ref 21–31)
CARBON DIOXIDE: 28 MEQ/L (ref 21–31)
CARBON DIOXIDE: 28 MEQ/L (ref 21–31)
CHLAMYDIA PNEUMONIAE: NOT DETECTED
CHLORIDE SERPL-SCNC: 100 MMOL/L (ref 97–110)
CHLORIDE, RANDOM URINE: 152 MEQ/L (ref 110–250)
CHLORIDE, SERUM: 101 MMOL/L (ref 98–107)
CHLORIDE, SERUM: 102 MMOL/L (ref 98–107)
CHLORIDE, SERUM: 103 MMOL/L (ref 98–107)
CHLORIDE, SERUM: 103 MMOL/L (ref 98–107)
CHLORIDE, SERUM: 104 MMOL/L (ref 98–107)
CHLORIDE, SERUM: 104 MMOL/L (ref 98–107)
CHLORIDE, SERUM: 105 MMOL/L (ref 98–107)
CHLORIDE, SERUM: 106 MMOL/L (ref 98–107)
CHLORIDE, SERUM: 107 MMOL/L (ref 98–107)
CHLORIDE, SERUM: 108 MMOL/L (ref 98–107)
CHLORIDE, SERUM: 108 MMOL/L (ref 98–107)
CHLORIDE, SERUM: 110 MMOL/L (ref 98–107)
CHLORIDE, SERUM: 111 MMOL/L (ref 98–107)
CHLORIDE, SERUM: 112 MMOL/L (ref 98–107)
CHLORIDE, SERUM: 113 MMOL/L (ref 98–107)
CHLORIDE, SERUM: 113 MMOL/L (ref 98–107)
CHLORIDE, SERUM: 114 MMOL/L (ref 98–107)
CHLORIDE, SERUM: 118 MMOL/L (ref 98–107)
CHLORIDE, SERUM: 119 MMOL/L (ref 98–107)
CHLORIDE, SERUM: 120 MMOL/L (ref 98–107)
CHLORIDE, SERUM: 121 MMOL/L (ref 98–107)
CHLORIDE, SERUM: 98 MMOL/L (ref 98–107)
CHLORIDE, SERUM: 99 MMOL/L (ref 98–107)
CHOLEST SERPL-MCNC: 161 MG/DL
CHOLEST/HDLC SERPL: 3.7 {RATIO}
CHRONIC KIDNEY DISEASE STAGE: ABNORMAL
CO2 SERPL-SCNC: 31 MMOL/L (ref 21–32)
COLOR: ABNORMAL
COLOR: YELLOW
CORONAVIRUS 229E: NOT DETECTED
CORONAVIRUS HKU1: NOT DETECTED
CORONAVIRUS NL63: NOT DETECTED
CORONAVIRUS OC43: NOT DETECTED
CORTISOL AM: 4.2 UG/DL (ref 6.7–22.6)
COVID-19 RAPID RESULT: NEGATIVE
COVID-19 RAPID SOURCE: NORMAL
CREAT SERPL-MCNC: 1.03 MG/DL (ref 0.67–1.17)
CREATININE, RANDOM URINE: 89.7 MG/DL (ref 16–241)
CREATININE: 1.74 MG/DL (ref 0.7–1.3)
CREATININE: 1.79 MG/DL (ref 0.7–1.3)
CREATININE: 1.8 MG/DL (ref 0.7–1.3)
CREATININE: 1.82 MG/DL (ref 0.7–1.3)
CREATININE: 1.85 MG/DL (ref 0.7–1.3)
CREATININE: 1.91 MG/DL (ref 0.7–1.3)
CREATININE: 1.92 MG/DL (ref 0.7–1.3)
CREATININE: 2.01 MG/DL (ref 0.7–1.3)
CREATININE: 2.01 MG/DL (ref 0.7–1.3)
CREATININE: 2.09 MG/DL (ref 0.7–1.3)
CREATININE: 2.12 MG/DL (ref 0.7–1.3)
CREATININE: 2.16 MG/DL (ref 0.7–1.3)
CREATININE: 2.2 MG/DL (ref 0.7–1.3)
CREATININE: 2.21 MG/DL (ref 0.7–1.3)
CREATININE: 2.26 MG/DL (ref 0.7–1.3)
CREATININE: 2.26 MG/DL (ref 0.7–1.3)
CREATININE: 2.28 MG/DL (ref 0.7–1.3)
CREATININE: 2.31 MG/DL (ref 0.7–1.3)
CREATININE: 2.39 MG/DL (ref 0.7–1.3)
CREATININE: 2.43 MG/DL (ref 0.7–1.3)
CREATININE: 2.71 MG/DL (ref 0.7–1.3)
CREATININE: 2.8 MG/DL (ref 0.7–1.3)
CREATININE: 2.96 MG/DL (ref 0.7–1.3)
CREATININE: 4.57 MG/DL (ref 0.7–1.3)
CREATININE: 5.41 MG/DL (ref 0.7–1.3)
CRYPTOSPORIDIUM: NOT DETECTED
CSF CULTURE: NORMAL
CULTURE REFLEX COMMENT: YES
CYCLOSPORA CAYETANENSIS: NOT DETECTED
DEPRECATED RDW RBC: 40.6 FL (ref 39–50)
E. COLI O157: ABNORMAL
EAEC E. COLI ENTEROAGGREGATIVE: NOT DETECTED
EIEC E. COLI SHIG/ENTERO: NOT DETECTED
ENTAMOEBA HISTOLYTICA: NOT DETECTED
EOSINOPHIL # BLD: 0.3 K/MCL (ref 0–0.5)
EOSINOPHIL AUTOMATED: 0 %
EOSINOPHIL AUTOMATED: 0.1 %
EOSINOPHIL AUTOMATED: 0.4 %
EOSINOPHIL AUTOMATED: 1.1 %
EOSINOPHIL AUTOMATED: 1.9 %
EOSINOPHIL AUTOMATED: 2.5 %
EOSINOPHIL AUTOMATED: 2.7 %
EOSINOPHIL AUTOMATED: 3 %
EOSINOPHIL AUTOMATED: 3.7 %
EOSINOPHIL NFR BLD: 3 %
EOSINOPHILS: 0 (ref 0–0.6)
EOSINOPHILS: 0.1 (ref 0–0.6)
EOSINOPHILS: 0.2 (ref 0–0.6)
EOSINOPHILS: 0.3 (ref 0–0.6)
EOSINOPHILS: 0.4 (ref 0–0.6)
EOSINOPHILS: 0.5 (ref 0–0.6)
EOSINOPHILS: 0.5 (ref 0–0.6)
EOSINOPHILS: 0.6 (ref 0–0.6)
EPEC E. COLI ENTEROPATHOGENIC: NOT DETECTED
ERYTHROCYTE [DISTWIDTH] IN BLOOD: 12.4 % (ref 11–15)
EST GLOMERULAR FILTRATION RATE: 11 ML/MIN
EST GLOMERULAR FILTRATION RATE: 14 ML/MIN
EST GLOMERULAR FILTRATION RATE: 23 ML/MIN
EST GLOMERULAR FILTRATION RATE: 25 ML/MIN
EST GLOMERULAR FILTRATION RATE: 26 ML/MIN
EST GLOMERULAR FILTRATION RATE: 30 ML/MIN
EST GLOMERULAR FILTRATION RATE: 30 ML/MIN
EST GLOMERULAR FILTRATION RATE: 31 ML/MIN
EST GLOMERULAR FILTRATION RATE: 32 ML/MIN
EST GLOMERULAR FILTRATION RATE: 33 ML/MIN
EST GLOMERULAR FILTRATION RATE: 33 ML/MIN
EST GLOMERULAR FILTRATION RATE: 34 ML/MIN
EST GLOMERULAR FILTRATION RATE: 35 ML/MIN
EST GLOMERULAR FILTRATION RATE: 35 ML/MIN
EST GLOMERULAR FILTRATION RATE: 37 ML/MIN
EST GLOMERULAR FILTRATION RATE: 37 ML/MIN
EST GLOMERULAR FILTRATION RATE: 39 ML/MIN
EST GLOMERULAR FILTRATION RATE: 39 ML/MIN
EST GLOMERULAR FILTRATION RATE: 41 ML/MIN
EST GLOMERULAR FILTRATION RATE: 42 ML/MIN
EST GLOMERULAR FILTRATION RATE: 42 ML/MIN
EST GLOMERULAR FILTRATION RATE: 43 ML/MIN
EST GLOMERULAR FILTRATION RATE: 44 ML/MIN
ESTIMATED AVERAGE GLUCOSE: 105 MG/DL
ETEC E. COLI ENTEROTOXIGENIC: NOT DETECTED
FASTING DURATION TIME PATIENT: ABNORMAL H
FASTING DURATION TIME PATIENT: NORMAL H
FIO2: 100 % (ref 21–100)
FIO2: 21 % (ref 21–100)
FIO2: 70 % (ref 21–100)
FOLIC ACID (FOLATE): 10.7 NG/ML
GFR SERPLBLD BASED ON 1.73 SQ M-ARVRAT: 83 ML/MIN
GIARDIA LAMBIA: NOT DETECTED
GLOBULIN SER-MCNC: 3.2 G/DL (ref 2–4)
GLUCOSE SERPL-MCNC: 82 MG/DL (ref 70–99)
GLUCOSE, CSF: 81 MG/DL (ref 40–70)
GLUCOSE, URINE, AUTOMATED: ABNORMAL MG/DL
GLUCOSE, URINE, AUTOMATED: ABNORMAL MG/DL
GLUCOSE: 102 MG/DL (ref 70–99)
GLUCOSE: 103 MG/DL (ref 70–99)
GLUCOSE: 107 MG/DL (ref 70–99)
GLUCOSE: 111 MG/DL (ref 70–99)
GLUCOSE: 113 MG/DL (ref 70–99)
GLUCOSE: 122 MG/DL (ref 70–99)
GLUCOSE: 124 MG/DL (ref 70–99)
GLUCOSE: 130 MG/DL (ref 70–99)
GLUCOSE: 131 MG/DL (ref 70–99)
GLUCOSE: 137 MG/DL (ref 70–99)
GLUCOSE: 140 MG/DL (ref 70–99)
GLUCOSE: 145 MG/DL (ref 70–99)
GLUCOSE: 147 MG/DL (ref 70–99)
GLUCOSE: 158 MG/DL (ref 70–99)
GLUCOSE: 176 MG/DL (ref 70–99)
GLUCOSE: 189 MG/DL (ref 70–99)
GLUCOSE: 229 MG/DL (ref 70–99)
GLUCOSE: 404 MG/DL (ref 70–99)
GLUCOSE: 81 MG/DL (ref 70–99)
GLUCOSE: 85 MG/DL (ref 70–99)
GLUCOSE: 87 MG/DL (ref 70–99)
GLUCOSE: 93 MG/DL (ref 70–99)
GLUCOSE: 94 MG/DL (ref 70–99)
GLUCOSE: 96 MG/DL (ref 70–99)
GLUCOSE: 97 MG/DL (ref 70–99)
GRAM STAIN: NORMAL
GRANULAR CAST: ABNORMAL /LPF
HCO3 ARTERIAL: 16.4 MMOL/L (ref 21–28)
HCO3 ARTERIAL: 18.5 MMOL/L (ref 21–28)
HCO3 ARTERIAL: 20.4 MMOL/L (ref 21–28)
HCO3 ARTERIAL: 22.8 MMOL/L (ref 21–28)
HCO3 ARTERIAL: 24.9 MMOL/L (ref 21–28)
HCT VFR BLD CALC: 41.4 % (ref 39–51)
HDLC SERPL-MCNC: 43 MG/DL
HEMATOCRIT: 25.6 % (ref 38.6–49.2)
HEMATOCRIT: 27.9 % (ref 38.6–49.2)
HEMATOCRIT: 30.6 % (ref 38.6–49.2)
HEMATOCRIT: 34.2 % (ref 38.6–49.2)
HEMATOCRIT: 34.6 % (ref 38.6–49.2)
HEMATOCRIT: 35.2 % (ref 38.6–49.2)
HEMATOCRIT: 35.8 % (ref 38.6–49.2)
HEMATOCRIT: 36.6 % (ref 38.6–49.2)
HEMATOCRIT: 36.9 % (ref 38.6–49.2)
HEMATOCRIT: 37.3 % (ref 38.6–49.2)
HEMATOCRIT: 38.4 % (ref 38.6–49.2)
HEMATOCRIT: 38.6 % (ref 38.6–49.2)
HEMATOCRIT: 39 % (ref 38.6–49.2)
HEMATOCRIT: 39.6 % (ref 38.6–49.2)
HEMATOCRIT: 40.2 % (ref 38.6–49.2)
HEMATOCRIT: 42.2 % (ref 38.6–49.2)
HEMATOCRIT: 47.3 % (ref 38.6–49.2)
HEMATOCRIT: 48.7 % (ref 38.6–49.2)
HEMOGLOBIN A1C (GLYCOSYLATED): 5.3 %
HEMOGLOBIN: 10.4 GM/DL (ref 13–17)
HEMOGLOBIN: 11.7 GM/DL (ref 13–17)
HEMOGLOBIN: 11.8 GM/DL (ref 13–17)
HEMOGLOBIN: 11.9 GM/DL (ref 13–17)
HEMOGLOBIN: 12.2 GM/DL (ref 13–17)
HEMOGLOBIN: 12.4 GM/DL (ref 13–17)
HEMOGLOBIN: 12.5 GM/DL (ref 13–17)
HEMOGLOBIN: 12.7 GM/DL (ref 13–17)
HEMOGLOBIN: 12.9 GM/DL (ref 13–17)
HEMOGLOBIN: 13.1 GM/DL (ref 13–17)
HEMOGLOBIN: 13.3 GM/DL (ref 13–17)
HEMOGLOBIN: 13.4 GM/DL (ref 13–17)
HEMOGLOBIN: 13.4 GM/DL (ref 13–17)
HEMOGLOBIN: 14.3 GM/DL (ref 13–17)
HEMOGLOBIN: 16 GM/DL (ref 13–17)
HEMOGLOBIN: 16.1 GM/DL (ref 13–17)
HEMOGLOBIN: 7.3 GM/DL (ref 13–17)
HEMOGLOBIN: 7.7 GM/DL (ref 13–17)
HEMOGLOBIN: 8.1 GM/DL (ref 13–17)
HEMOGLOBIN: 8.3 GM/DL (ref 13–17)
HEMOGLOBIN: 8.6 GM/DL (ref 13–17)
HEMOGLOBIN: 8.6 GM/DL (ref 13–17)
HEMOGLOBIN: 9.4 GM/DL (ref 13–17)
HEPATITIS B SURFACE ANTIGEN: NORMAL
HERPES SIMPLEX, TYPE 1: NOT DETECTED
HERPES SIMPLEX, TYPE 2: NOT DETECTED
HGB BLD-MCNC: 14 G/DL (ref 13–17)
HSV SOURCE: NORMAL
HYALINE CAST: ABNORMAL /LPF
IMM GRANULOCYTES # BLD AUTO: 0.1 K/MCL (ref 0–0.2)
IMM GRANULOCYTES # BLD: 1 %
INFECTIOUS MONONUCLE: 4 % (ref 3–13)
INFECTIOUS MONONUCLE: 7 % (ref 3–13)
INFLUENZA A: NOT DETECTED
INFLUENZA B: NOT DETECTED
INTERNATIONAL NORMAL: 1.2 (ref 0.8–1.1)
INTERNATIONAL NORMAL: 1.2 (ref 0.8–1.1)
INTERNATIONAL NORMAL: 1.3 (ref 0.8–1.1)
INTERNATIONAL NORMAL: 1.4 (ref 0.8–1.1)
INTERNATIONAL NORMAL: 1.7 (ref 0.8–1.1)
K (POTASSIUM, SERUM): 2.7 MMOL/L (ref 3.5–5.2)
K (POTASSIUM, SERUM): 3.1 MMOL/L (ref 3.5–5.2)
K (POTASSIUM, SERUM): 3.1 MMOL/L (ref 3.5–5.2)
K (POTASSIUM, SERUM): 3.2 MMOL/L (ref 3.5–5.2)
K (POTASSIUM, SERUM): 3.3 MMOL/L (ref 3.5–5.2)
K (POTASSIUM, SERUM): 3.4 MMOL/L (ref 3.5–5.2)
K (POTASSIUM, SERUM): 3.6 MMOL/L (ref 3.5–5.2)
K (POTASSIUM, SERUM): 3.8 MMOL/L (ref 3.5–5.2)
K (POTASSIUM, SERUM): 3.9 MMOL/L (ref 3.5–5.2)
K (POTASSIUM, SERUM): 3.9 MMOL/L (ref 3.5–5.2)
K (POTASSIUM, SERUM): 4 MMOL/L (ref 3.5–5.2)
K (POTASSIUM, SERUM): 4.2 MMOL/L (ref 3.5–5.2)
K (POTASSIUM, SERUM): 4.3 MMOL/L (ref 3.5–5.2)
K (POTASSIUM, SERUM): 4.3 MMOL/L (ref 3.5–5.2)
K (POTASSIUM, SERUM): 4.5 MMOL/L (ref 3.5–5.2)
K (POTASSIUM, SERUM): 4.7 MMOL/L (ref 3.5–5.2)
KEPPRA (LEVETIRACETAM) LEVEL: 32 UG/ML (ref 6–46)
KEPPRA (LEVETIRACETAM) LEVEL: 45 UG/ML (ref 6–46)
KEPPRA (LEVETIRACETAM) LEVEL: 64.4 UG/ML (ref 6–46)
KETONES, URINE, AUTOMATED: ABNORMAL MG/DL
KETONES, URINE, AUTOMATED: ABNORMAL MG/DL
LACTIC ACID: 1.5 MMOL/L (ref 0.5–2)
LACTIC ACID: 1.9 MMOL/L (ref 0.5–2)
LACTIC ACID: 2.9 MMOL/L (ref 0.5–2)
LACTIC ACID: 2.9 MMOL/L (ref 0.5–2)
LAMOTRIGINE LVL: 12.8 UG/ML (ref 3–15)
LAMOTRIGINE LVL: 9.6 UG/ML (ref 3–15)
LDLC SERPL CALC-MCNC: 100 MG/DL
LEUKOCYTE, URINE, AUTOMATED: ABNORMAL
LEUKOCYTE, URINE, AUTOMATED: ABNORMAL
LYMPHOCYTE AUTOMATED: 10.8 %
LYMPHOCYTE AUTOMATED: 11.2 %
LYMPHOCYTE AUTOMATED: 13.7 %
LYMPHOCYTE AUTOMATED: 21.2 %
LYMPHOCYTE AUTOMATED: 3.6 %
LYMPHOCYTE AUTOMATED: 4.1 %
LYMPHOCYTE AUTOMATED: 6 %
LYMPHOCYTE AUTOMATED: 7.2 %
LYMPHOCYTE AUTOMATED: 7.8 %
LYMPHOCYTE AUTOMATED: 9 %
LYMPHOCYTE AUTOMATED: 9.6 %
LYMPHOCYTE MANUAL: 10 % (ref 10–47)
LYMPHOCYTE MANUAL: 5 % (ref 10–47)
LYMPHOCYTES # BLD: 3.5 K/MCL (ref 1–4)
LYMPHOCYTES NFR BLD: 34 %
LYMPHOCYTES: 1 (ref 0.78–3.73)
LYMPHOCYTES: 1.1 (ref 0.78–3.73)
LYMPHOCYTES: 1.2 (ref 0.78–3.73)
LYMPHOCYTES: 1.4 (ref 0.78–3.73)
LYMPHOCYTES: 1.5 (ref 0.78–3.73)
LYMPHOCYTES: 1.5 (ref 0.78–3.73)
LYMPHOCYTES: 1.6 (ref 0.78–3.73)
LYMPHOCYTES: 1.7 (ref 0.78–3.73)
LYMPHOCYTES: 1.9 (ref 0.78–3.73)
LYMPHOCYTES: 2.4 (ref 0.78–3.73)
LYMPHOCYTES: 4.3 (ref 0.78–3.73)
Lab: NORMAL %
MCH RBC QN AUTO: 30.4 PG (ref 26–34)
MCHC RBC AUTO-ENTMCNC: 33.8 G/DL (ref 32–36.5)
MCV RBC AUTO: 90 FL (ref 78–100)
MEAN CORPUSCULAR HGB: 31.1 PG (ref 26–34)
MEAN CORPUSCULAR HGB: 31.2 PG (ref 26–34)
MEAN CORPUSCULAR HGB: 31.2 PG (ref 26–34)
MEAN CORPUSCULAR HGB: 31.3 PG (ref 26–34)
MEAN CORPUSCULAR HGB: 31.3 PG (ref 26–34)
MEAN CORPUSCULAR HGB: 31.4 PG (ref 26–34)
MEAN CORPUSCULAR HGB: 31.4 PG (ref 26–34)
MEAN CORPUSCULAR HGB: 31.5 PG (ref 26–34)
MEAN CORPUSCULAR HGB: 31.6 PG (ref 26–34)
MEAN CORPUSCULAR HGB: 31.7 PG (ref 26–34)
MEAN CORPUSCULAR HGB: 31.7 PG (ref 26–34)
MEAN CORPUSCULAR HGB: 31.8 PG (ref 26–34)
MEAN CORPUSCULAR HGB: 32 PG (ref 26–34)
MEAN CORPUSCULAR VOL: 91.9 FL (ref 80–100)
MEAN CORPUSCULAR VOL: 92.1 FL (ref 80–100)
MEAN CORPUSCULAR VOL: 92.2 FL (ref 80–100)
MEAN CORPUSCULAR VOL: 92.4 FL (ref 80–100)
MEAN CORPUSCULAR VOL: 92.6 FL (ref 80–100)
MEAN CORPUSCULAR VOL: 92.9 FL (ref 80–100)
MEAN CORPUSCULAR VOL: 93 FL (ref 80–100)
MEAN CORPUSCULAR VOL: 93.1 FL (ref 80–100)
MEAN CORPUSCULAR VOL: 93.2 FL (ref 80–100)
MEAN CORPUSCULAR VOL: 93.4 FL (ref 80–100)
MEAN CORPUSCULAR VOL: 93.4 FL (ref 80–100)
MEAN CORPUSCULAR VOL: 93.5 FL (ref 80–100)
MEAN CORPUSCULAR VOL: 93.6 FL (ref 80–100)
MEAN CORPUSCULAR VOL: 93.7 FL (ref 80–100)
MEAN CORPUSCULAR VOL: 93.9 FL (ref 80–100)
MEAN CORPUSCULAR VOL: 94 FL (ref 80–100)
MEAN CORPUSCULAR VOL: 94 FL (ref 80–100)
MEAN CORPUSCULAR VOL: 95.6 FL (ref 80–100)
MEAN PLATELET VOLUME: 7.2 FL (ref 6.8–10.2)
MEAN PLATELET VOLUME: 7.3 FL (ref 6.8–10.2)
MEAN PLATELET VOLUME: 7.4 FL (ref 6.8–10.2)
MEAN PLATELET VOLUME: 7.4 FL (ref 6.8–10.2)
MEAN PLATELET VOLUME: 7.5 FL (ref 6.8–10.2)
MEAN PLATELET VOLUME: 7.5 FL (ref 6.8–10.2)
MEAN PLATELET VOLUME: 7.6 FL (ref 6.8–10.2)
MEAN PLATELET VOLUME: 7.7 FL (ref 6.8–10.2)
MEAN PLATELET VOLUME: 7.7 FL (ref 6.8–10.2)
MEAN PLATELET VOLUME: 7.8 FL (ref 6.8–10.2)
MEAN PLATELET VOLUME: 7.9 FL (ref 6.8–10.2)
MEAN PLATELET VOLUME: 8.2 FL (ref 6.8–10.2)
MEAN PLATELET VOLUME: 8.4 FL (ref 6.8–10.2)
MEAN PLATELET VOLUME: 8.6 FL (ref 6.8–10.2)
MEAN PLATELET VOLUME: 8.7 FL (ref 6.8–10.2)
MEAN PLATELET VOLUME: 8.8 FL (ref 6.8–10.2)
METAMYELOCYTE: 2 % (ref 0–0)
METAPNEUMOVIRUS: NOT DETECTED
MG (MAGNESIUM, SERUM: 1.8 MG/DL (ref 1.6–2.6)
MG (MAGNESIUM, SERUM: 1.8 MG/DL (ref 1.6–2.6)
MG (MAGNESIUM, SERUM: 1.9 MG/DL (ref 1.6–2.6)
MG (MAGNESIUM, SERUM: 2 MG/DL (ref 1.6–2.6)
MG (MAGNESIUM, SERUM: 2.1 MG/DL (ref 1.6–2.6)
MG (MAGNESIUM, SERUM: 2.3 MG/DL (ref 1.6–2.6)
MG (MAGNESIUM, SERUM: 2.6 MG/DL (ref 1.6–2.6)
MG (MAGNESIUM, SERUM: 2.7 MG/DL (ref 1.6–2.6)
MONOCYTE AUTOMATED: 10 %
MONOCYTE AUTOMATED: 4.1 %
MONOCYTE AUTOMATED: 4.5 %
MONOCYTE AUTOMATED: 4.6 %
MONOCYTE AUTOMATED: 4.6 %
MONOCYTE AUTOMATED: 4.9 %
MONOCYTE AUTOMATED: 5.1 %
MONOCYTE AUTOMATED: 5.1 %
MONOCYTE AUTOMATED: 5.7 %
MONOCYTE AUTOMATED: 6.2 %
MONOCYTE AUTOMATED: 7.6 %
MONOCYTES # BLD: 1 K/MCL (ref 0.3–0.9)
MONOCYTES NFR BLD: 9 %
MONOCYTES: 0.7 (ref 0.17–1)
MONOCYTES: 0.8 (ref 0.17–1)
MONOCYTES: 0.9 (ref 0.17–1)
MONOCYTES: 1.1 (ref 0.17–1)
MONOCYTES: 1.2 (ref 0.17–1)
MONOCYTES: 1.3 (ref 0.17–1)
MONOCYTES: 1.5 (ref 0.17–1)
MONOCYTES: 1.5 (ref 0.17–1)
MONOCYTES: 1.8 (ref 0.17–1)
MRSA SCREEN, PCR: NEGATIVE
MRSA SOURCE: NORMAL
MUCUS: ABNORMAL /LPF
MYCOPLASMA PNEUMONIAE: NOT DETECTED
NA (SODIUM, SERUM): 132 MMOL/L (ref 133–144)
NA (SODIUM, SERUM): 135 MMOL/L (ref 133–144)
NA (SODIUM, SERUM): 137 MMOL/L (ref 133–144)
NA (SODIUM, SERUM): 137 MMOL/L (ref 133–144)
NA (SODIUM, SERUM): 138 MMOL/L (ref 133–144)
NA (SODIUM, SERUM): 139 MMOL/L (ref 133–144)
NA (SODIUM, SERUM): 140 MMOL/L (ref 133–144)
NA (SODIUM, SERUM): 145 MMOL/L (ref 133–144)
NA (SODIUM, SERUM): 146 MMOL/L (ref 133–144)
NA (SODIUM, SERUM): 147 MMOL/L (ref 133–144)
NA (SODIUM, SERUM): 147 MMOL/L (ref 133–144)
NA (SODIUM, SERUM): 148 MMOL/L (ref 133–144)
NA (SODIUM, SERUM): 149 MMOL/L (ref 133–144)
NA (SODIUM, SERUM): 149 MMOL/L (ref 133–144)
NA (SODIUM, SERUM): 151 MMOL/L (ref 133–144)
NEUTROPHIL ABSOLUTE: 11.8 (ref 1.91–7.6)
NEUTROPHIL ABSOLUTE: 12.2 (ref 1.91–7.6)
NEUTROPHIL ABSOLUTE: 13 (ref 1.91–7.6)
NEUTROPHIL ABSOLUTE: 13.5 (ref 1.91–7.6)
NEUTROPHIL ABSOLUTE: 14.1 (ref 1.91–7.6)
NEUTROPHIL ABSOLUTE: 15.5 (ref 1.91–7.6)
NEUTROPHIL ABSOLUTE: 17.1 (ref 1.91–7.6)
NEUTROPHIL ABSOLUTE: 17.7 (ref 1.91–7.6)
NEUTROPHIL ABSOLUTE: 18.3 (ref 1.91–7.6)
NEUTROPHIL ABSOLUTE: 23 (ref 1.91–7.6)
NEUTROPHIL ABSOLUTE: 27.3 (ref 1.91–7.6)
NEUTROPHIL AUTOMATED: 69.6 %
NEUTROPHIL AUTOMATED: 73.6 %
NEUTROPHIL AUTOMATED: 80.1 %
NEUTROPHIL AUTOMATED: 80.5 %
NEUTROPHIL AUTOMATED: 81.1 %
NEUTROPHIL AUTOMATED: 83 %
NEUTROPHIL AUTOMATED: 87.5 %
NEUTROPHIL AUTOMATED: 87.6 %
NEUTROPHIL AUTOMATED: 87.6 %
NEUTROPHIL AUTOMATED: 91.1 %
NEUTROPHIL AUTOMATED: 91.2 %
NEUTROPHILS # BLD: 5.5 K/MCL (ref 1.8–7.7)
NEUTROPHILS # CALCULATED: 29.9 K/MM3
NEUTROPHILS # CALCULATED: 9 K/MM3
NEUTROPHILS NFR BLD: 52 %
NEUTROPHILS: 72 % (ref 33–82)
NEUTROPHILS: 77 % (ref 33–82)
NITRITE, URINE AUTOMATED: NEGATIVE
NITRITE, URINE AUTOMATED: NEGATIVE
NONHDLC SERPL-MCNC: 118 MG/DL
NOROVIRUS GI/GII: DETECTED
NRBC BLD MANUAL-RTO: 0 /100 WBC
O2 APPLIANCE: ABNORMAL
OXYHEMOGLOBIN: 93 % (ref 94–98)
OXYHEMOGLOBIN: 93.3 % (ref 94–98)
OXYHEMOGLOBIN: 94 % (ref 94–98)
OXYHEMOGLOBIN: 98 % (ref 94–98)
OXYHEMOGLOBIN: 98.4 % (ref 94–98)
PARAINFLUENZA 1: NOT DETECTED
PARAINFLUENZA 2: NOT DETECTED
PARAINFLUENZA 3: NOT DETECTED
PARAINFLUENZA 4: NOT DETECTED
PARATHYROID INTACT: 261.7 PG/ML (ref 12–88)
PCO2 ARTERIAL: 30.8 MM HG (ref 35–48)
PCO2 ARTERIAL: 33.3 MM HG (ref 35–48)
PCO2 ARTERIAL: 33.4 MM HG (ref 35–48)
PCO2 ARTERIAL: 36.3 MM HG (ref 35–48)
PCO2 ARTERIAL: 51.2 MM HG (ref 35–48)
PEEP: 5 CMH2O
PEEP: 5 CMH2O
PERIPHERAL BLOOD SMEAR, PATH: NORMAL
PH, URINE: 5 (ref 5–8)
PH, URINE: 6 (ref 5–8)
PH: 7.27 (ref 7.35–7.45)
PH: 7.33 (ref 7.35–7.45)
PH: 7.34 (ref 7.35–7.45)
PH: 7.41 (ref 7.35–7.45)
PH: 7.49 (ref 7.35–7.45)
PHOSPHORUS, SERUM: 2.2 MG/DL (ref 2.5–4.5)
PHOSPHORUS, SERUM: 2.4 MG/DL (ref 2.5–4.5)
PHOSPHORUS, SERUM: 2.5 MG/DL (ref 2.5–4.5)
PHOSPHORUS, SERUM: 2.6 MG/DL (ref 2.5–4.5)
PLATELET # BLD AUTO: 304 K/MCL (ref 140–450)
PLATELET COMMENT: ABNORMAL
PLATELET COMMENT: NORMAL
PLATELET COUNT: 167 K/MM3 (ref 150–450)
PLATELET COUNT: 175 K/MM3 (ref 150–450)
PLATELET COUNT: 181 K/MM3 (ref 150–450)
PLATELET COUNT: 198 K/MM3 (ref 150–450)
PLATELET COUNT: 200 K/MM3 (ref 150–450)
PLATELET COUNT: 230 K/MM3 (ref 150–450)
PLATELET COUNT: 247 K/MM3 (ref 150–450)
PLATELET COUNT: 266 K/MM3 (ref 150–450)
PLATELET COUNT: 280 K/MM3 (ref 150–450)
PLATELET COUNT: 301 K/MM3 (ref 150–450)
PLATELET COUNT: 302 K/MM3 (ref 150–450)
PLATELET COUNT: 308 K/MM3 (ref 150–450)
PLATELET COUNT: 309 K/MM3 (ref 150–450)
PLATELET COUNT: 322 K/MM3 (ref 150–450)
PLATELET COUNT: 338 K/MM3 (ref 150–450)
PLATELET COUNT: 384 K/MM3 (ref 150–450)
PLATELET COUNT: 411 K/MM3 (ref 150–450)
PLATELET COUNT: 443 K/MM3 (ref 150–450)
PLATELET MORPHOLOGY: ABNORMAL
PLATELET MORPHOLOGY: NORMAL
PLEESIOMONAS SHIGELLOIDES: NOT DETECTED
PO2 ARTERIAL: 113.2 MM HG (ref 83–108)
PO2 ARTERIAL: 268.8 MM HG (ref 83–108)
PO2 ARTERIAL: 66.3 MM HG (ref 83–108)
PO2 ARTERIAL: 71.8 MM HG (ref 83–108)
PO2 ARTERIAL: 72.1 MM HG (ref 83–108)
PO2/FIO2 RATIO: 103
PO2/FIO2 RATIO: 113
PO2/FIO2 RATIO: 269
PO2/FIO2 RATIO: 316
PO2/FIO2 RATIO: 72
POTASSIUM SERPL-SCNC: 4 MMOL/L (ref 3.4–5.1)
POTASSIUM, RANDOM URINE: 18 MEQ/L (ref 25–125)
PROT SERPL-MCNC: 6.9 G/DL (ref 6.4–8.2)
PROTEIN TOTAL: 5.1 G/DL (ref 6.4–8.9)
PROTEIN TOTAL: 5.4 G/DL (ref 6.4–8.9)
PROTEIN TOTAL: 5.5 G/DL (ref 6.4–8.9)
PROTEIN TOTAL: 5.8 G/DL (ref 6.4–8.9)
PROTEIN TOTAL: 8.3 G/DL (ref 6.4–8.9)
PROTEIN, URINE: 100 MG/DL
PROTEIN, URINE: ABNORMAL MG/DL
PROTHROMBIN TIME (PATIENT): 13.2 SECONDS (ref 10.1–13.1)
PROTHROMBIN TIME (PATIENT): 14 SECONDS (ref 10.1–13.1)
PROTHROMBIN TIME (PATIENT): 15.1 SECONDS (ref 10.1–13.1)
PROTHROMBIN TIME (PATIENT): 16.3 SECONDS (ref 10.1–13.1)
PROTHROMBIN TIME (PATIENT): 19.6 SECONDS (ref 10.1–13.1)
PTT: 113 SECONDS (ref 25–36)
PTT: 133 SECONDS (ref 25–36)
PTT: 147 SECONDS (ref 25–36)
PTT: 180 SECONDS (ref 25–36)
PTT: 20 SECONDS (ref 25–36)
PTT: 34 SECONDS (ref 25–36)
PTT: 34 SECONDS (ref 25–36)
PTT: 41 SECONDS (ref 25–36)
PTT: 52 SECONDS (ref 25–36)
PTT: 53 SECONDS (ref 25–36)
PTT: 54 SECONDS (ref 25–36)
PTT: 54 SECONDS (ref 25–36)
PTT: 58 SECONDS (ref 25–36)
PTT: 60 SECONDS (ref 25–36)
PTT: 63 SECONDS (ref 25–36)
PTT: 64 SECONDS (ref 25–36)
PTT: 65 SECONDS (ref 25–36)
PTT: 67 SECONDS (ref 25–36)
PTT: 70 SECONDS (ref 25–36)
PTT: 71 SECONDS (ref 25–36)
PTT: 72 SECONDS (ref 25–36)
PTT: 75 SECONDS (ref 25–36)
PTT: 75 SECONDS (ref 25–36)
PTT: 80 SECONDS (ref 25–36)
PTT: 82 SECONDS (ref 25–36)
PTT: 83 SECONDS (ref 25–36)
PTT: 89 SECONDS (ref 25–36)
PTT: 92 SECONDS (ref 25–36)
PTT: 92 SECONDS (ref 25–36)
RBC # BLD: 4.6 MIL/MCL (ref 4.5–5.9)
RBC MORPHOLOGY: NORMAL
RBC MORPHOLOGY: NORMAL
RBC: ABNORMAL /HPF (ref 0–2)
RBC: ABNORMAL /HPF (ref 0–2)
RED BLOOD CELL COUNT: 2.76 M/MM3 (ref 4.34–5.6)
RED BLOOD CELL COUNT: 2.98 M/MM3 (ref 4.34–5.6)
RED BLOOD CELL COUNT: 3.32 M/MM3 (ref 4.34–5.6)
RED BLOOD CELL COUNT: 3.68 M/MM3 (ref 4.34–5.6)
RED BLOOD CELL COUNT: 3.73 M/MM3 (ref 4.34–5.6)
RED BLOOD CELL COUNT: 3.76 M/MM3 (ref 4.34–5.6)
RED BLOOD CELL COUNT: 3.83 M/MM3 (ref 4.34–5.6)
RED BLOOD CELL COUNT: 3.96 M/MM3 (ref 4.34–5.6)
RED BLOOD CELL COUNT: 3.97 M/MM3 (ref 4.34–5.6)
RED BLOOD CELL COUNT: 4 M/MM3 (ref 4.34–5.6)
RED BLOOD CELL COUNT: 4.12 M/MM3 (ref 4.34–5.6)
RED BLOOD CELL COUNT: 4.14 M/MM3 (ref 4.34–5.6)
RED BLOOD CELL COUNT: 4.22 M/MM3 (ref 4.34–5.6)
RED BLOOD CELL COUNT: 4.27 M/MM3 (ref 4.34–5.6)
RED BLOOD CELL COUNT: 4.28 M/MM3 (ref 4.34–5.6)
RED BLOOD CELL COUNT: 4.51 M/MM3 (ref 4.34–5.6)
RED BLOOD CELL COUNT: 5.02 M/MM3 (ref 4.34–5.6)
RED BLOOD CELL COUNT: 5.1 M/MM3 (ref 4.34–5.6)
RED CELL DISTRIBUTIO: 13.4 % (ref 11.9–15.9)
RED CELL DISTRIBUTIO: 13.6 % (ref 11.9–15.9)
RED CELL DISTRIBUTIO: 13.6 % (ref 11.9–15.9)
RED CELL DISTRIBUTIO: 13.7 % (ref 11.9–15.9)
RED CELL DISTRIBUTIO: 13.8 % (ref 11.9–15.9)
RED CELL DISTRIBUTIO: 13.9 % (ref 11.9–15.9)
RED CELL DISTRIBUTIO: 14.1 % (ref 11.9–15.9)
RED CELL DISTRIBUTIO: 14.3 % (ref 11.9–15.9)
RED CELL DISTRIBUTIO: 14.4 % (ref 11.9–15.9)
RED CELL DISTRIBUTIO: 14.5 % (ref 11.9–15.9)
RESPIRATION RATE: 16
RESPIRATION RATE: 27
RESPIRATORY VIRAL PANEL SOURCE: NORMAL
RHINO/ENTEROVIRUS: NOT DETECTED
ROTAVIRUS A: NOT DETECTED
RSV: NOT DETECTED
SALMONELLA: NOT DETECTED
SAPOVIRUS: NOT DETECTED
SARS COV2: NOT DETECTED
SEDIMENTATION RATE: 25 MM/HR (ref 0–20)
SET RESPIRATION RATE: 16
SET RESPIRATION RATE: 16
SITE: ABNORMAL
SODIUM SERPL-SCNC: 140 MMOL/L (ref 135–145)
SODIUM, RANDOM URINE: 122.9 MEQ/L (ref 40–220)
SPEC SRC GI PANEL: ABNORMAL
SPECIFIC GRAVITY UA: 1 (ref 1–1.03)
SPECIFIC GRAVITY UA: 1.02 (ref 1–1.03)
SPUTUM CULTURE: NORMAL
SPUTUM CULTURE: NORMAL
SQUAMOUS EPITHELIAL: ABNORMAL /LPF
SQUAMOUS EPITHELIAL: ABNORMAL /LPF
STEC E. COLI SHIGA TOXIN PROD: NOT DETECTED
TESTOST SERPL-MCNC: 752.1 NG/DL (ref 280–1100)
THYROID STIMULATING: 0.32 MIU/ML (ref 0.27–4.2)
TIDAL VOLUME: 500 ML
TIDAL VOLUME: 500 ML
TOTAL HEMOGLOBIN ARTERIAL: 15.1 G/DL (ref 13.5–17.5)
TOTAL HEMOGLOBIN ARTERIAL: 15.2 G/DL (ref 13.5–17.5)
TOTAL HEMOGLOBIN ARTERIAL: 16.1 G/DL (ref 13.5–17.5)
TOTAL HEMOGLOBIN ARTERIAL: 9.1 G/DL (ref 13.5–17.5)
TOTAL HEMOGLOBIN ARTERIAL: 9.2 G/DL (ref 13.5–17.5)
TOTAL PROTEIN, CSF: 196 MG/DL (ref 15–45)
TRIGL SERPL-MCNC: 89 MG/DL
TRIGLYCERIDES STAT: 101 MG/DL (ref 50–149)
TROPONIN I HIGH SENSITIVITY: 750 PG/ML (ref 0–20)
TROPONIN I HIGH SENSITIVITY: 819 PG/ML (ref 0–20)
TSH SERPL-ACNC: 0.94 MCUNITS/ML (ref 0.35–5)
URIC ACID: 7.4 MG/DL (ref 4.4–7.6)
URINE CULTURE: NORMAL
URINE, BLOOD, AUTOMATED: ABNORMAL
URINE, BLOOD, AUTOMATED: ABNORMAL
UROBILINOGEN, URINE, AUTOMATED: ABNORMAL MG/DL
UROBILINOGEN, URINE, AUTOMATED: ABNORMAL MG/DL
VANCOMYCIN RANDOM LD DATE: NORMAL
VANCOMYCIN RANDOM LD DATE: NORMAL
VANCOMYCIN RANDOM LD TIME: 1005
VANCOMYCIN RANDOM LD TIME: 853
VANCOMYCIN TROUGH LD DATE: ABNORMAL
VANCOMYCIN TROUGH LD TIME: ABNORMAL
VANCOMYCIN TROUGH: 9.2 UG/ML (ref 10–20)
VANCOMYCIN, RANDOM: 12.5 UG/ML (ref 10–40)
VANCOMYCIN, RANDOM: 13 UG/ML (ref 10–40)
VENTILATION: ABNORMAL
VENTILATION: ABNORMAL
VIBRIO CHOLERAE: NOT DETECTED
VIBRIO: NOT DETECTED
VITAMIN B12: 274 PG/ML (ref 180–914)
VITAMIN D, 25 OH TOTAL: 39 NG/ML (ref 30–100)
WBC # BLD: 10.4 K/MCL (ref 4.2–11)
WBC: ABNORMAL /HPF (ref 0–5)
WBC: ABNORMAL /HPF (ref 0–5)
WHITE BLOOD CELL COU: 11.6 K/MM3 (ref 4–11)
WHITE BLOOD CELL COU: 11.8 K/MM3 (ref 4–11)
WHITE BLOOD CELL COU: 14.7 K/MM3 (ref 4–11)
WHITE BLOOD CELL COU: 15.1 K/MM3 (ref 4–11)
WHITE BLOOD CELL COU: 15.7 K/MM3 (ref 4–11)
WHITE BLOOD CELL COU: 16.5 K/MM3 (ref 4–11)
WHITE BLOOD CELL COU: 16.6 K/MM3 (ref 4–11)
WHITE BLOOD CELL COU: 16.8 K/MM3 (ref 4–11)
WHITE BLOOD CELL COU: 17.7 K/MM3 (ref 4–11)
WHITE BLOOD CELL COU: 18.7 K/MM3 (ref 4–11)
WHITE BLOOD CELL COU: 19.6 K/MM3 (ref 4–11)
WHITE BLOOD CELL COU: 20.2 K/MM3 (ref 4–11)
WHITE BLOOD CELL COU: 20.3 K/MM3 (ref 4–11)
WHITE BLOOD CELL COU: 21 K/MM3 (ref 4–11)
WHITE BLOOD CELL COU: 23.2 K/MM3 (ref 4–11)
WHITE BLOOD CELL COU: 25.3 K/MM3 (ref 4–11)
WHITE BLOOD CELL COU: 29.9 K/MM3 (ref 4–11)
WHITE BLOOD CELL COU: 33.7 K/MM3 (ref 4–11)
YERSINIA ENTEROCOLITICA: NOT DETECTED

## 2023-01-01 PROCEDURE — 99232 SBSQ HOSP IP/OBS MODERATE 35: CPT | Performed by: INTERNAL MEDICINE

## 2023-01-01 PROCEDURE — 92950 HEART/LUNG RESUSCITATION CPR: CPT | Performed by: INTERNAL MEDICINE

## 2023-01-01 PROCEDURE — 80053 COMPREHEN METABOLIC PANEL: CPT | Performed by: INTERNAL MEDICINE

## 2023-01-01 PROCEDURE — 99233 SBSQ HOSP IP/OBS HIGH 50: CPT | Performed by: HOSPITALIST

## 2023-01-01 PROCEDURE — 99233 SBSQ HOSP IP/OBS HIGH 50: CPT | Performed by: INTERNAL MEDICINE

## 2023-01-01 PROCEDURE — 93306 TTE W/DOPPLER COMPLETE: CPT | Performed by: INTERNAL MEDICINE

## 2023-01-01 PROCEDURE — 93010 ELECTROCARDIOGRAM REPORT: CPT | Performed by: INTERNAL MEDICINE

## 2023-01-01 PROCEDURE — X1094 NO CHARGE VISIT: HCPCS | Performed by: INTERNAL MEDICINE

## 2023-01-01 PROCEDURE — 99291 CRITICAL CARE FIRST HOUR: CPT | Performed by: HOSPITALIST

## 2023-01-01 PROCEDURE — X1094 NO CHARGE VISIT: HCPCS | Performed by: FAMILY MEDICINE

## 2023-01-01 PROCEDURE — 99222 1ST HOSP IP/OBS MODERATE 55: CPT | Performed by: INTERNAL MEDICINE

## 2023-01-01 PROCEDURE — 96372 THER/PROPH/DIAG INJ SC/IM: CPT | Performed by: INTERNAL MEDICINE

## 2023-01-01 PROCEDURE — 82306 VITAMIN D 25 HYDROXY: CPT | Performed by: INTERNAL MEDICINE

## 2023-01-01 PROCEDURE — 36620 INSERTION CATHETER ARTERY: CPT | Performed by: INTERNAL MEDICINE

## 2023-01-01 PROCEDURE — 99291 CRITICAL CARE FIRST HOUR: CPT | Performed by: INTERNAL MEDICINE

## 2023-01-01 PROCEDURE — 84403 ASSAY OF TOTAL TESTOSTERONE: CPT | Performed by: INTERNAL MEDICINE

## 2023-01-01 PROCEDURE — 27301 DRAIN THIGH/KNEE LESION: CPT | Performed by: ORTHOPAEDIC SURGERY

## 2023-01-01 PROCEDURE — 99214 OFFICE O/P EST MOD 30 MIN: CPT | Performed by: INTERNAL MEDICINE

## 2023-01-01 PROCEDURE — 36415 COLL VENOUS BLD VENIPUNCTURE: CPT | Performed by: INTERNAL MEDICINE

## 2023-01-01 PROCEDURE — 80061 LIPID PANEL: CPT | Performed by: INTERNAL MEDICINE

## 2023-01-01 PROCEDURE — 99223 1ST HOSP IP/OBS HIGH 75: CPT | Performed by: ORTHOPAEDIC SURGERY

## 2023-01-01 PROCEDURE — 99231 SBSQ HOSP IP/OBS SF/LOW 25: CPT | Performed by: INTERNAL MEDICINE

## 2023-01-01 PROCEDURE — 85025 COMPLETE CBC W/AUTO DIFF WBC: CPT | Performed by: INTERNAL MEDICINE

## 2023-01-01 PROCEDURE — 93308 TTE F-UP OR LMTD: CPT | Performed by: INTERNAL MEDICINE

## 2023-01-01 PROCEDURE — G0439 PPPS, SUBSEQ VISIT: HCPCS | Performed by: INTERNAL MEDICINE

## 2023-01-01 PROCEDURE — 36556 INSERT NON-TUNNEL CV CATH: CPT | Performed by: INTERNAL MEDICINE

## 2023-01-01 PROCEDURE — 31500 INSERT EMERGENCY AIRWAY: CPT | Performed by: INTERNAL MEDICINE

## 2023-01-01 PROCEDURE — 99223 1ST HOSP IP/OBS HIGH 75: CPT | Performed by: INTERNAL MEDICINE

## 2023-01-01 PROCEDURE — 84443 ASSAY THYROID STIM HORMONE: CPT | Performed by: INTERNAL MEDICINE

## 2023-01-01 RX ORDER — TESTOSTERONE CYPIONATE 200 MG/ML
150 INJECTION, SOLUTION INTRAMUSCULAR
Status: SHIPPED | OUTPATIENT
Start: 2023-01-01 | End: 2023-08-29

## 2023-01-01 RX ORDER — DOXYCYCLINE 100 MG/1
100 CAPSULE ORAL 2 TIMES DAILY
Qty: 20 CAPSULE | Refills: 0 | Status: SHIPPED | OUTPATIENT
Start: 2023-01-01 | End: 2023-01-01

## 2023-01-01 RX ORDER — HYDROCORTISONE 10 MG/1
TABLET ORAL
Qty: 90 TABLET | Refills: 5 | Status: SHIPPED | OUTPATIENT
Start: 2023-01-01 | End: 2023-05-10

## 2023-01-01 RX ORDER — LEVOTHYROXINE SODIUM 88 UG/1
TABLET ORAL
Qty: 30 TABLET | Refills: 5 | Status: SHIPPED | OUTPATIENT
Start: 2023-01-01 | End: 2023-05-10

## 2023-01-01 RX ORDER — LAMOTRIGINE 200 MG/1
200 TABLET ORAL DAILY
Status: CANCELLED | OUTPATIENT
Start: 2023-01-01

## 2023-01-01 RX ORDER — CLONAZEPAM 0.5 MG/1
TABLET ORAL
Qty: 30 TABLET | Refills: 0 | Status: SHIPPED | OUTPATIENT
Start: 2023-01-01 | End: 2023-05-10

## 2023-01-01 RX ORDER — ESLICARBAZEPINE ACETATE 400 MG/1
TABLET ORAL
Qty: 30 TABLET | Refills: 5 | Status: SHIPPED | OUTPATIENT
Start: 2023-01-01 | End: 2023-05-10

## 2023-01-01 RX ORDER — PRAVASTATIN SODIUM 10 MG
TABLET ORAL
Qty: 30 TABLET | Refills: 5 | Status: SHIPPED | OUTPATIENT
Start: 2023-01-01 | End: 2023-05-10

## 2023-01-01 RX ORDER — LAMOTRIGINE 200 MG/1
200 TABLET ORAL DAILY
Qty: 30 TABLET | Refills: 0 | Status: SHIPPED | OUTPATIENT
Start: 2023-01-01 | End: 2023-05-10

## 2023-01-01 RX ORDER — CLONAZEPAM 0.5 MG/1
TABLET ORAL
Status: CANCELLED | OUTPATIENT
Start: 2023-01-01

## 2023-01-01 RX ADMIN — TESTOSTERONE CYPIONATE 150 MG: 200 INJECTION, SOLUTION INTRAMUSCULAR at 10:42

## 2023-01-01 RX ADMIN — TESTOSTERONE CYPIONATE 150 MG: 200 INJECTION, SOLUTION INTRAMUSCULAR at 11:15

## 2023-01-01 RX ADMIN — TESTOSTERONE CYPIONATE 150 MG: 200 INJECTION, SOLUTION INTRAMUSCULAR at 10:26

## 2023-01-01 RX ADMIN — TESTOSTERONE CYPIONATE 150 MG: 200 INJECTION, SOLUTION INTRAMUSCULAR at 11:43

## 2023-01-01 ASSESSMENT — ENCOUNTER SYMPTOMS
DIAPHORESIS: 0
COUGH: 0
DIARRHEA: 0
GASTROINTESTINAL NEGATIVE: 1
PHOTOPHOBIA: 0
DIZZINESS: 0
BACK PAIN: 0
CHILLS: 0
FATIGUE: 0
VOMITING: 0
SINUS PRESSURE: 0
WEAKNESS: 0
EYE DISCHARGE: 0
ACTIVITY CHANGE: 0
FEVER: 0
SORE THROAT: 0
WOUND: 1
RESPIRATORY NEGATIVE: 1
CHEST TIGHTNESS: 0
NERVOUS/ANXIOUS: 0
FACIAL SWELLING: 0
NAUSEA: 0
TROUBLE SWALLOWING: 0
HEADACHES: 0
EYE REDNESS: 0
UNEXPECTED WEIGHT CHANGE: 1
LIGHT-HEADEDNESS: 1
ABDOMINAL PAIN: 0

## 2023-01-01 ASSESSMENT — PATIENT HEALTH QUESTIONNAIRE - PHQ9
SUM OF ALL RESPONSES TO PHQ9 QUESTIONS 1 AND 2: 0
1. LITTLE INTEREST OR PLEASURE IN DOING THINGS: NOT AT ALL
SUM OF ALL RESPONSES TO PHQ9 QUESTIONS 1 AND 2: 0
CLINICAL INTERPRETATION OF PHQ2 SCORE: NO FURTHER SCREENING NEEDED
2. FEELING DOWN, DEPRESSED OR HOPELESS: NOT AT ALL

## 2023-01-19 PROBLEM — Z86.011 PERSONAL HISTORY OF MENINGIOMA OF THE BRAIN: Status: ACTIVE | Noted: 2023-01-01

## 2023-01-19 PROBLEM — D33.2 BENIGN NEOPLASM OF BRAIN (CMD): Status: RESOLVED | Noted: 2020-05-01 | Resolved: 2023-01-01

## 2023-05-09 ENCOUNTER — EXTERNAL RECORD (OUTPATIENT)
Dept: OTHER | Age: 63
End: 2023-05-09

## 2023-05-09 ENCOUNTER — TELEPHONE (OUTPATIENT)
Dept: INTERNAL MEDICINE | Age: 63
End: 2023-05-09

## 2023-05-09 LAB — BEDSIDE GLUCOSE: 117 MG/DL (ref 70–110)

## 2023-05-10 ENCOUNTER — APPOINTMENT (OUTPATIENT)
Dept: INTERNAL MEDICINE | Age: 63
End: 2023-05-10

## 2023-07-27 ENCOUNTER — APPOINTMENT (OUTPATIENT)
Dept: INTERNAL MEDICINE | Age: 63
End: 2023-07-27